# Patient Record
Sex: MALE | Race: WHITE
[De-identification: names, ages, dates, MRNs, and addresses within clinical notes are randomized per-mention and may not be internally consistent; named-entity substitution may affect disease eponyms.]

---

## 2017-06-28 ENCOUNTER — HOSPITAL ENCOUNTER (OUTPATIENT)
Dept: HOSPITAL 53 - M SFHCPLAZ | Age: 82
End: 2017-06-28
Attending: FAMILY MEDICINE
Payer: MEDICARE

## 2017-06-28 DIAGNOSIS — R41.3: ICD-10-CM

## 2017-06-28 DIAGNOSIS — Z79.899: ICD-10-CM

## 2017-06-28 DIAGNOSIS — Z79.84: ICD-10-CM

## 2017-06-28 DIAGNOSIS — R41.89: Primary | ICD-10-CM

## 2017-06-28 LAB
T4 FREE SERPL-MCNC: 0.86 NG/DL (ref 0.76–1.46)
VIT B12 SERPL-MCNC: 1073 PG/ML (ref 247–911)

## 2017-06-28 PROCEDURE — 36415 COLL VENOUS BLD VENIPUNCTURE: CPT

## 2017-06-28 PROCEDURE — 82607 VITAMIN B-12: CPT

## 2017-06-28 PROCEDURE — 82306 VITAMIN D 25 HYDROXY: CPT

## 2017-06-28 PROCEDURE — 84439 ASSAY OF FREE THYROXINE: CPT

## 2017-06-28 PROCEDURE — 84443 ASSAY THYROID STIM HORMONE: CPT

## 2017-07-25 ENCOUNTER — HOSPITAL ENCOUNTER (OUTPATIENT)
Dept: HOSPITAL 53 - M LABDRAW1 | Age: 82
End: 2017-07-25
Attending: PSYCHIATRY & NEUROLOGY
Payer: MEDICARE

## 2017-07-25 DIAGNOSIS — F03.90: Primary | ICD-10-CM

## 2017-07-25 DIAGNOSIS — Z13.29: ICD-10-CM

## 2017-07-25 DIAGNOSIS — R20.9: ICD-10-CM

## 2017-07-25 DIAGNOSIS — Z79.899: ICD-10-CM

## 2017-07-25 LAB
ALBUMIN SERPL BCG-MCNC: 3.9 GM/DL (ref 3.2–5.2)
ALBUMIN/GLOB SERPL: 1.11 {RATIO} (ref 1–1.93)
ALP SERPL-CCNC: 65 U/L (ref 45–117)
ALT SERPL W P-5'-P-CCNC: 18 U/L (ref 12–78)
ANION GAP SERPL CALC-SCNC: 3 MEQ/L (ref 8–16)
AST SERPL-CCNC: 14 U/L (ref 15–37)
BASOPHILS # BLD AUTO: 0 K/MM3 (ref 0–0.2)
BASOPHILS NFR BLD AUTO: 0.5 % (ref 0–1)
BILIRUB SERPL-MCNC: 0.6 MG/DL (ref 0.2–1)
BUN SERPL-MCNC: 21 MG/DL (ref 7–18)
CALCIUM SERPL-MCNC: 8.3 MG/DL (ref 8.8–10.2)
CHLORIDE SERPL-SCNC: 102 MEQ/L (ref 98–107)
CO2 SERPL-SCNC: 30 MEQ/L (ref 21–32)
CREAT SERPL-MCNC: 1.16 MG/DL (ref 0.7–1.3)
EOSINOPHIL # BLD AUTO: 0.1 K/MM3 (ref 0–0.5)
EOSINOPHIL NFR BLD AUTO: 1.7 % (ref 0–3)
ERYTHROCYTE [DISTWIDTH] IN BLOOD BY AUTOMATED COUNT: 13.8 % (ref 11.5–14.5)
ERYTHROCYTE [SEDIMENTATION RATE] IN BLOOD BY WESTERGREN METHOD: 35 MM/HR (ref 0–20)
EST. AVERAGE GLUCOSE BLD GHB EST-MCNC: 131 MG/DL (ref 60–110)
FOLATE SERPL-MCNC: > 24 NG/ML
GFR SERPL CREATININE-BSD FRML MDRD: > 60 ML/MIN/{1.73_M2} (ref 35–?)
GLUCOSE SERPL-MCNC: 102 MG/DL (ref 83–110)
LARGE UNSTAINED CELL #: 0.1 K/MM3 (ref 0–0.4)
LARGE UNSTAINED CELL %: 2.1 % (ref 0–4)
LYMPHOCYTES # BLD AUTO: 3 K/MM3 (ref 1.5–4.5)
LYMPHOCYTES NFR BLD AUTO: 44.7 % (ref 24–44)
MCH RBC QN AUTO: 31.9 PG (ref 27–33)
MCHC RBC AUTO-ENTMCNC: 34.6 G/DL (ref 32–36.5)
MCV RBC AUTO: 92.4 FL (ref 80–96)
MONOCYTES # BLD AUTO: 0.3 K/MM3 (ref 0–0.8)
MONOCYTES NFR BLD AUTO: 4.3 % (ref 0–5)
NEUTROPHILS # BLD AUTO: 3.1 K/MM3 (ref 1.8–7.7)
NEUTROPHILS NFR BLD AUTO: 46.7 % (ref 36–66)
PLATELET # BLD AUTO: 189 K/MM3 (ref 150–450)
POTASSIUM SERPL-SCNC: 4.2 MEQ/L (ref 3.5–5.1)
PROT SERPL-MCNC: 7.4 GM/DL (ref 6.4–8.2)
SODIUM SERPL-SCNC: 135 MEQ/L (ref 136–145)
VIT B12 SERPL-MCNC: 1762 PG/ML
WBC # BLD AUTO: 6.7 K/MM3 (ref 4–10)

## 2017-07-29 LAB — A-TOCOPHEROL VIT E SERPL-MCNC: 28.5 MG/L (ref 5.3–17.5)

## 2017-11-17 ENCOUNTER — HOSPITAL ENCOUNTER (OUTPATIENT)
Dept: HOSPITAL 53 - M LABDRAW1 | Age: 82
End: 2017-11-17
Attending: ORTHOPAEDIC SURGERY
Payer: MEDICARE

## 2017-11-17 DIAGNOSIS — G56.02: ICD-10-CM

## 2017-11-17 DIAGNOSIS — Z01.812: Primary | ICD-10-CM

## 2017-11-17 LAB
ANION GAP SERPL CALC-SCNC: 7 MEQ/L (ref 8–16)
BUN SERPL-MCNC: 23 MG/DL (ref 7–18)
CALCIUM SERPL-MCNC: 8.9 MG/DL (ref 8.8–10.2)
CHLORIDE SERPL-SCNC: 102 MEQ/L (ref 98–107)
CO2 SERPL-SCNC: 29 MEQ/L (ref 21–32)
CREAT SERPL-MCNC: 1.22 MG/DL (ref 0.7–1.3)
GFR SERPL CREATININE-BSD FRML MDRD: > 60 ML/MIN/{1.73_M2} (ref 35–?)
GLUCOSE SERPL-MCNC: 100 MG/DL (ref 83–110)
POTASSIUM SERPL-SCNC: 4.3 MEQ/L (ref 3.5–5.1)
SODIUM SERPL-SCNC: 138 MEQ/L (ref 136–145)

## 2017-12-04 ENCOUNTER — HOSPITAL ENCOUNTER (OUTPATIENT)
Dept: HOSPITAL 53 - M SFHCPLAZ | Age: 82
End: 2017-12-04
Attending: FAMILY MEDICINE
Payer: MEDICARE

## 2017-12-04 DIAGNOSIS — R41.3: Primary | ICD-10-CM

## 2017-12-04 LAB — VIT B12 SERPL-MCNC: 398 PG/ML (ref 247–911)

## 2019-03-22 ENCOUNTER — APPOINTMENT (RX ONLY)
Dept: URBAN - METROPOLITAN AREA CLINIC 61 | Facility: CLINIC | Age: 84
Setting detail: DERMATOLOGY
End: 2019-03-22

## 2019-03-22 DIAGNOSIS — L81.4 OTHER MELANIN HYPERPIGMENTATION: ICD-10-CM

## 2019-03-22 DIAGNOSIS — L82.1 OTHER SEBORRHEIC KERATOSIS: ICD-10-CM

## 2019-03-22 DIAGNOSIS — Z85.828 PERSONAL HISTORY OF OTHER MALIGNANT NEOPLASM OF SKIN: ICD-10-CM | Status: RESOLVED

## 2019-03-22 DIAGNOSIS — D22 MELANOCYTIC NEVI: ICD-10-CM

## 2019-03-22 PROBLEM — M12.9 ARTHROPATHY, UNSPECIFIED: Status: ACTIVE | Noted: 2019-03-22

## 2019-03-22 PROBLEM — L57.0 ACTINIC KERATOSIS: Status: ACTIVE | Noted: 2019-03-22

## 2019-03-22 PROBLEM — K21.9 GASTRO-ESOPHAGEAL REFLUX DISEASE WITHOUT ESOPHAGITIS: Status: ACTIVE | Noted: 2019-03-22

## 2019-03-22 PROBLEM — I10 ESSENTIAL (PRIMARY) HYPERTENSION: Status: ACTIVE | Noted: 2019-03-22

## 2019-03-22 PROBLEM — E78.5 HYPERLIPIDEMIA, UNSPECIFIED: Status: ACTIVE | Noted: 2019-03-22

## 2019-03-22 PROBLEM — E13.9 OTHER SPECIFIED DIABETES MELLITUS WITHOUT COMPLICATIONS: Status: ACTIVE | Noted: 2019-03-22

## 2019-03-22 PROBLEM — D22.5 MELANOCYTIC NEVI OF TRUNK: Status: ACTIVE | Noted: 2019-03-22

## 2019-03-22 PROBLEM — M06.9 RHEUMATOID ARTHRITIS, UNSPECIFIED: Status: ACTIVE | Noted: 2019-03-22

## 2019-03-22 PROCEDURE — 99213 OFFICE O/P EST LOW 20 MIN: CPT

## 2019-03-22 PROCEDURE — ? INVENTORY

## 2019-03-22 PROCEDURE — ? COUNSELING

## 2019-03-22 ASSESSMENT — LOCATION SIMPLE DESCRIPTION DERM
LOCATION SIMPLE: UPPER BACK
LOCATION SIMPLE: LEFT SHOULDER
LOCATION SIMPLE: RIGHT SHOULDER
LOCATION SIMPLE: LEFT UPPER BACK

## 2019-03-22 ASSESSMENT — LOCATION ZONE DERM
LOCATION ZONE: ARM
LOCATION ZONE: TRUNK

## 2019-03-22 ASSESSMENT — LOCATION DETAILED DESCRIPTION DERM
LOCATION DETAILED: LEFT MID-UPPER BACK
LOCATION DETAILED: LEFT POSTERIOR SHOULDER
LOCATION DETAILED: RIGHT POSTERIOR SHOULDER
LOCATION DETAILED: INFERIOR THORACIC SPINE

## 2019-07-10 ENCOUNTER — HOSPITAL ENCOUNTER (OUTPATIENT)
Dept: HOSPITAL 53 - M ED | Age: 84
Setting detail: OBSERVATION
LOS: 1 days | Discharge: HOME | End: 2019-07-11
Attending: INTERNAL MEDICINE | Admitting: INTERNAL MEDICINE
Payer: MEDICARE

## 2019-07-10 VITALS — HEIGHT: 69 IN | WEIGHT: 230.49 LBS | BODY MASS INDEX: 34.14 KG/M2

## 2019-07-10 VITALS — SYSTOLIC BLOOD PRESSURE: 122 MMHG | DIASTOLIC BLOOD PRESSURE: 60 MMHG

## 2019-07-10 DIAGNOSIS — I69.311: ICD-10-CM

## 2019-07-10 DIAGNOSIS — I48.91: ICD-10-CM

## 2019-07-10 DIAGNOSIS — R55: Primary | ICD-10-CM

## 2019-07-10 DIAGNOSIS — M06.9: ICD-10-CM

## 2019-07-10 DIAGNOSIS — M19.90: ICD-10-CM

## 2019-07-10 DIAGNOSIS — G47.33: ICD-10-CM

## 2019-07-10 DIAGNOSIS — E11.9: ICD-10-CM

## 2019-07-10 DIAGNOSIS — E78.5: ICD-10-CM

## 2019-07-10 DIAGNOSIS — Z87.820: ICD-10-CM

## 2019-07-10 DIAGNOSIS — Z79.82: ICD-10-CM

## 2019-07-10 DIAGNOSIS — Z79.899: ICD-10-CM

## 2019-07-10 DIAGNOSIS — I10: ICD-10-CM

## 2019-07-10 DIAGNOSIS — Z79.84: ICD-10-CM

## 2019-07-10 DIAGNOSIS — H40.9: ICD-10-CM

## 2019-07-10 DIAGNOSIS — F03.90: ICD-10-CM

## 2019-07-10 DIAGNOSIS — K21.9: ICD-10-CM

## 2019-07-10 LAB
AMPHETAMINES UR QL SCN: NEGATIVE
BARBITURATES UR QL SCN: NEGATIVE
BASE EXCESS BLDV CALC-SCNC: -2.8 MMOL/L (ref -2–2)
BASOPHILS # BLD AUTO: 0 10^3/UL (ref 0–0.2)
BASOPHILS NFR BLD AUTO: 0.2 % (ref 0–1)
BENZODIAZ UR QL SCN: NEGATIVE
BUN SERPL-MCNC: 28 MG/DL (ref 7–18)
BZE UR QL SCN: NEGATIVE
CALCIUM SERPL-MCNC: 8.3 MG/DL (ref 8.8–10.2)
CANNABINOIDS UR QL SCN: NEGATIVE
CHLORIDE SERPL-SCNC: 109 MEQ/L (ref 98–107)
CK MB CFR.DF SERPL CALC: 1.42
CK MB SERPL-MCNC: 1.9 NG/ML (ref ?–3.6)
CK SERPL-CCNC: 134 U/L (ref 39–308)
CO2 BLDV CALC-SCNC: 25.8 MEQ/L (ref 24–28)
CO2 SERPL-SCNC: 30 MEQ/L (ref 21–32)
CREAT SERPL-MCNC: 1.24 MG/DL (ref 0.7–1.3)
EOSINOPHIL # BLD AUTO: 0.1 10^3/UL (ref 0–0.5)
EOSINOPHIL NFR BLD AUTO: 0.9 % (ref 0–3)
ETHANOL SERPL-MCNC: < 0.003 % (ref 0–0.01)
GFR SERPL CREATININE-BSD FRML MDRD: 59.4 ML/MIN/{1.73_M2} (ref 35–?)
GLUCOSE SERPL-MCNC: 137 MG/DL (ref 70–100)
HCO3 BLDV-SCNC: 24.2 MEQ/L (ref 23–27)
HCO3 STD BLDV-SCNC: 21.3 MEQ/L
HCT VFR BLD AUTO: 33.7 % (ref 42–52)
HGB BLD-MCNC: 11.3 G/DL (ref 13.5–17.5)
INR PPP: 1.14
LYMPHOCYTES # BLD AUTO: 1.3 10^3/UL (ref 1.5–4.5)
LYMPHOCYTES NFR BLD AUTO: 20.5 % (ref 24–44)
MCH RBC QN AUTO: 32.9 PG (ref 27–33)
MCHC RBC AUTO-ENTMCNC: 33.5 G/DL (ref 32–36.5)
MCV RBC AUTO: 98.3 FL (ref 80–96)
METHADONE UR QL SCN: NEGATIVE
MONOCYTES # BLD AUTO: 0.4 10^3/UL (ref 0–0.8)
MONOCYTES NFR BLD AUTO: 5.8 % (ref 0–5)
NEUTROPHILS # BLD AUTO: 4.6 10^3/UL (ref 1.8–7.7)
NEUTROPHILS NFR BLD AUTO: 72.1 % (ref 36–66)
OPIATES UR QL SCN: NEGATIVE
PCO2 BLDV: 51.9 MMHG (ref 38–50)
PCP UR QL SCN: NEGATIVE
PH BLDV: 7.29 UNITS (ref 7.33–7.43)
PLATELET # BLD AUTO: 127 10^3/UL (ref 150–450)
PO2 BLDV: 32.8 MMHG (ref 30–50)
POTASSIUM SERPL-SCNC: 4.7 MEQ/L (ref 3.5–5.1)
PROTHROMBIN TIME: 14.3 SECONDS (ref 11.8–14)
RBC # BLD AUTO: 3.43 10^6/UL (ref 4.3–6.1)
SAO2 % BLDV: 52.1 % (ref 60–80)
SODIUM SERPL-SCNC: 142 MEQ/L (ref 136–145)
TROPONIN I SERPL-MCNC: < 0.02 NG/ML (ref ?–0.1)
TSH SERPL DL<=0.005 MIU/L-ACNC: 1.88 UIU/ML (ref 0.36–3.74)
WBC # BLD AUTO: 6.4 10^3/UL (ref 4–10)

## 2019-07-10 PROCEDURE — 80048 BASIC METABOLIC PNL TOTAL CA: CPT

## 2019-07-10 PROCEDURE — 71045 X-RAY EXAM CHEST 1 VIEW: CPT

## 2019-07-10 PROCEDURE — 80307 DRUG TEST PRSMV CHEM ANLYZR: CPT

## 2019-07-10 PROCEDURE — 84484 ASSAY OF TROPONIN QUANT: CPT

## 2019-07-10 PROCEDURE — 82553 CREATINE MB FRACTION: CPT

## 2019-07-10 PROCEDURE — 70450 CT HEAD/BRAIN W/O DYE: CPT

## 2019-07-10 PROCEDURE — 97530 THERAPEUTIC ACTIVITIES: CPT

## 2019-07-10 PROCEDURE — 99285 EMERGENCY DEPT VISIT HI MDM: CPT

## 2019-07-10 PROCEDURE — 82550 ASSAY OF CK (CPK): CPT

## 2019-07-10 PROCEDURE — 84443 ASSAY THYROID STIM HORMONE: CPT

## 2019-07-10 PROCEDURE — 36415 COLL VENOUS BLD VENIPUNCTURE: CPT

## 2019-07-10 PROCEDURE — 97161 PT EVAL LOW COMPLEX 20 MIN: CPT

## 2019-07-10 PROCEDURE — 93041 RHYTHM ECG TRACING: CPT

## 2019-07-10 PROCEDURE — 82803 BLOOD GASES ANY COMBINATION: CPT

## 2019-07-10 PROCEDURE — 85610 PROTHROMBIN TIME: CPT

## 2019-07-10 PROCEDURE — 85025 COMPLETE CBC W/AUTO DIFF WBC: CPT

## 2019-07-10 PROCEDURE — 93005 ELECTROCARDIOGRAM TRACING: CPT

## 2019-07-10 PROCEDURE — 96372 THER/PROPH/DIAG INJ SC/IM: CPT

## 2019-07-10 PROCEDURE — 83605 ASSAY OF LACTIC ACID: CPT

## 2019-07-10 PROCEDURE — 97165 OT EVAL LOW COMPLEX 30 MIN: CPT

## 2019-07-10 RX ADMIN — SODIUM CHLORIDE SCH UNITS: 4.5 INJECTION, SOLUTION INTRAVENOUS at 22:17

## 2019-07-10 RX ADMIN — SODIUM CHLORIDE SCH MLS/HR: 9 INJECTION, SOLUTION INTRAVENOUS at 14:01

## 2019-07-10 RX ADMIN — BRIMONIDINE TARTRATE SCH DROP: 1 SOLUTION/ DROPS OPHTHALMIC at 22:24

## 2019-07-10 NOTE — HPEPDOC
General


Date of Admission


07/10/2019


Date of Service:  Jul 10, 2019


Chief Complaint


The patient is a 83-year-old male admitted with a reason for visit of Syncope.


Source:  Old records


Exam Limitations:  Dementia


Timing/Duration:  Unsure


Severity:  Mild


Associated Symptoms:  Unobtainable





History of Present Illness


This is an 83-year-old male who apparently had a syncopal event at home while 

working outdoors gardening. He is unable to tell me anything about the event. 

Currently, he is oriented strictly to self. There are no family members at 

bedside.





Home Medications


Scheduled


Aspirin (Aspir 81) 81 Mg Tablet.dr, 81 MG PO QHS, (Reported)


Brimonidine Tartrate (Alphagan P) 0.1% 5ML Drops, 1 DROP OU Q8H, (Reported)


Donepezil HCl (Donepezil HCl) 10 Mg Tablet, 10 MG PO QHS, (Reported)


Dorzolamide HCl/Timolol Maleat (Dorzolamide-Timolol Eye Drops) 10 Ml Drops, 1 

DROP OU BID, (Reported)


Folic Acid (Folic Acid) 1 Mg Tablet, 1 MG PO DAILY, (Reported)


Latanoprost (Xalatan) 0.005% 2.5ML Drops, 1 DROP OU QHS, (Reported)


Lisinopril (Lisinopril) 5 Mg Tablet, 5 MG PO QHS, (Reported)


Memantine HCl (Memantine HCl) 10 Mg Tablet, 10 MG PO BID, (Reported)


Metformin HCl (Metformin HCl) 1,000 Mg Tablet, 1,000 MG PO BID, (Reported)


Mirabegron (Myrbetriq) 50 Mg Tab.er.24h, 50 MG PO DAILY, (Reported)


Quetiapine Fumarate (Quetiapine Fumarate) 25 Mg Tablet, 50 MG PO DAILY, 

(Reported)


Rosuvastatin Calcium (Rosuvastatin Calcium) Unknown Strength Tablet, Unknown 

Dose PO QHS, (Reported)





Allergies


Coded Allergies:  


     No Known Allergies (Verified , 10/9/03)





Past Medical History


Medical History


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 

Alzheimer's.


Significant memory deficit


Dyslipidemia.


Essential hypertension.


Obstructive sleep apnea, it is unclear whether he currently uses a CPAP mask.


Non-insulin-dependent diabetes mellitus.


Glaucoma.


Rheumatoid arthritis.


History of traumatic brain injury.


Gastroesophageal reflux disease.


Osteoarthritis


Surgical History


Surgical history is said to include


Right total hip arthroplasty, bilateral total knee replacements, unspecified 

shoulder surgery





Family History


Family history is unobtainable from the patient at this time.





Social History


* Smoker:  Denies


Alcohol:  Denies


Drugs:  denies


Psychosocial History:  Dementia





A-FIB/CHADSVASC


A-FIB History


Current/History of A-Fib/PAF?:  No


Current PO Anticoag Therapy:  No





Review of Systems


Other systems


I am unable to obtain a full review of systems from this patient. He simply 

states that he has nothing.





Physical Examination


General Exam:  Positive: Cooperative, No Acute Distress


Eye Exam:  Positive: PERRLA, Conjunctiva & lids normal


ENT Exam:  Positive: Atraumatic, Mucous membr. moist/pink, Tongue Midline, Other

ENT (good dentition)


Neck Exam:  Positive: Supple; 


   Negative: JVD, thyromegaly, +2 carotid pulse wo bruit, Lymphadenopathy, Other


Chest Exam:  Positive: Clear to auscultation, Normal air movement


Heart Exam:  Positive: Rate Normal, Regular Rhythm


Abdomen Exam:  Positive: Normal bowel sounds, Soft


Extremity Exam:  Positive: Normal pulses


Skin Exam:  Positive: Nl turgor and temperature (no notable wounds or abrasions)


Neuro Exam:  Positive: Cranial Nerves 3-12 NL


Psych Exam:  Positive: Memory Intact (the patient is very pleasant but clearly 

has memory and cognition deficits)





Vital Signs





Vital Signs








  Date Time  Temp Pulse Resp B/P (MAP) Pulse Ox O2 Delivery O2 Flow Rate FiO2


 


7/10/19 17:00   16 139/69 (92)    


 


7/10/19 16:53  66   100   


 


7/10/19 13:34 97.1     Room Air  











Laboratory Data


Labs 24H


Laboratory Tests 2


7/10/19 13:41: 


Immature Granulocyte % (Auto) 0.5, White Blood Count 6.4, Red Blood Count 3.43L,

Hemoglobin 11.3L, Hematocrit 33.7L, Mean Corpuscular Volume 98.3H, Mean 

Corpuscular Hemoglobin 32.9, Mean Corpuscular Hemoglobin Concent 33.5, Red Cell 

Distribution Width 14.6H, Platelet Count 127L, Neutrophils (%) (Auto) 72.1H, 

Lymphocytes (%) (Auto) 20.5L, Monocytes (%) (Auto) 5.8H, Eosinophils (%) (Auto) 

0.9, Basophils (%) (Auto) 0.2, Neutrophils # (Auto) 4.6, Lymphocytes # (Auto) 

1.3L, Monocytes # (Auto) 0.4, Eosinophils # (Auto) 0.1, Basophils # (Auto) 0.0, 

Nucleated Red Blood Cells % (auto) 0.0, Prothrombin Time 14.3H, Prothromb Time 

International Ratio 1.14, Blood Gas Bicarbonate Standard 21.3, Venous Blood pH 

7.286L, Venous Blood Partial Pressure CO2 51.9H, Venous Blood Partial Pressure 

O2 32.8, Venous Blood Total Carbon Dioxide 25.8, Venous Blood HCO3 24.2, Venous 

Blood Oxygen Saturation 52.1L, Venous Blood Base Excess -2.8L, Anion Gap 3L, 

Glomerular Filtration Rate 59.4, Lactic Acid Level 1.0, Blood Urea Nitrogen 28H,

Creatinine 1.24, Sodium Level 142, Potassium Level 4.7, Chloride Level 109H, 

Carbon Dioxide Level 30, Calcium Level 8.3L, Total Creatine Kinase 134, Creatine

Kinase MB 1.9, Creatine Kinase MB Relative Index 1.42, Troponin I < 0.02, 

Thyroid Stimulating Hormone (TSH) 1.880, Ethyl Alcohol Level < 0.003


CBC/BMP


Laboratory Tests


7/10/19 13:41








Red Blood Count 3.43 L, Mean Corpuscular Volume 98.3 H, Mean Corpuscular 

Hemoglobin 32.9, Mean Corpuscular Hemoglobin Concent 33.5, Red Cell Distribution

Width 14.6 H, Neutrophils (%) (Auto) 72.1 H, Lymphocytes (%) (Auto) 20.5 L, 

Monocytes (%) (Auto) 5.8 H, Eosinophils (%) (Auto) 0.9, Basophils (%) (Auto) 

0.2, Neutrophils # (Auto) 4.6, Lymphocytes # (Auto) 1.3 L, Monocytes # (Auto) 

0.4, Eosinophils # (Auto) 0.1, Basophils # (Auto) 0.0, Calcium Level 8.3 L, 

Total Creatine Kinase 134





 Assessment/Plan


This is an 83-year-old male admitted with syncope. He is currently awake, alert 

to his environment and conversant. At baseline he has dementia and memory 

deficits. Head CT was notable for significant parenchymal atrophy, but otherwise

no sign of injury or bleed. There is presence of old infarct. Laboratory data is

otherwise unremarkable. Patient has not demonstrated any form of cardiac 

dysrhythmia. We will monitor the patient overnight and have him assessed by 

physical and occupational therapy services. If he is otherwise intact he would 

be appropriate for discharge to home. He is consequently placed on observation 

status.





Plan / VTE


VTE Prophylaxis Ordered?:  Yes





Plan


Diet:  Continue Current


Activity:  Encourage Ambulation (with assistance)


Therapy:  PT, OT


Anticipated Discharge:  Home With Services











LUIS MAGDALENO MD         Jul 10, 2019 18:02

## 2019-07-10 NOTE — REP
Noncontrast CT brain:

 

History:  Syncope.

 

Comparison head CT study August 29, 2011.

 

CT findings:  Preliminary digital  radiograph is unremarkable.  On bone

window settings, the bony calvarium is intact.  Visualized paranasal sinuses are

clear.  There is vascular calcification in the distal internal carotid arteries

bilaterally.  There is moderate diffuse cerebral atrophy.  Periventricular white

matter changes are seen consistent with small vessel atherosclerotic findings.

There is a fairly large dense dural calcification in the anterior falx.  There is

no evidence of acute infarction.  No hemorrhage is seen.  There is a small low

density area at the inferior aspect of the basal ganglia on the left consistent

with an old lacunar infarct.  This is unchanged from the 2011 prior study.  There

is no evidence of acute hemorrhage.  No acute infarct is seen.  No mass or

midline shift is observed.

 

Impression:

 

Diffuse moderate atrophy and small vessel changes.  Vascular calcification.  Old

lacunar infarct left basal ganglia.  No acute intracranial abnormality.

 

 

Electronically Signed by

Carlitos Lindsey MD 07/10/2019 04:37 P

## 2019-07-10 NOTE — REP
CHEST, SINGLE VIEW:

 

COMPARISON:  09/13/2013

 

There is mild bibasilar fibroatelectatic change. There is no acute infiltrate.

Heart does not appear to be significantly enlarged. Mediastinal silhouette is

unremarkable.

 

IMPRESSION:

 

No acute infiltrate.

 

 

Electronically Signed by

Joaquín Rosas MD 07/12/2019 12:33 P

## 2019-07-11 VITALS — DIASTOLIC BLOOD PRESSURE: 62 MMHG | SYSTOLIC BLOOD PRESSURE: 115 MMHG

## 2019-07-11 LAB
BUN SERPL-MCNC: 21 MG/DL (ref 7–18)
CALCIUM SERPL-MCNC: 8.6 MG/DL (ref 8.8–10.2)
CHLORIDE SERPL-SCNC: 112 MEQ/L (ref 98–107)
CO2 SERPL-SCNC: 27 MEQ/L (ref 21–32)
CREAT SERPL-MCNC: 1.07 MG/DL (ref 0.7–1.3)
GFR SERPL CREATININE-BSD FRML MDRD: > 60 ML/MIN/{1.73_M2} (ref 35–?)
GLUCOSE SERPL-MCNC: 99 MG/DL (ref 70–100)
POTASSIUM SERPL-SCNC: 3.7 MEQ/L (ref 3.5–5.1)
SODIUM SERPL-SCNC: 144 MEQ/L (ref 136–145)

## 2019-07-11 RX ADMIN — BRIMONIDINE TARTRATE SCH DROP: 1 SOLUTION/ DROPS OPHTHALMIC at 05:11

## 2019-07-11 RX ADMIN — SODIUM CHLORIDE SCH MLS/HR: 9 INJECTION, SOLUTION INTRAVENOUS at 00:06

## 2019-07-11 RX ADMIN — SODIUM CHLORIDE SCH MLS/HR: 9 INJECTION, SOLUTION INTRAVENOUS at 09:25

## 2019-07-11 RX ADMIN — SODIUM CHLORIDE SCH UNITS: 4.5 INJECTION, SOLUTION INTRAVENOUS at 09:00

## 2019-07-11 NOTE — DS.PDOC
Discharge Summary


General


Date of Admission


Jul 10, 2019 at 18:02


Date of Discharge


07/11/2019





Discharge Summary


PROCEDURES PERFORMED DURING STAY: None.





ADMITTING DIAGNOSES: 


1. Syncope.





DISCHARGE DIAGNOSES:


1. Syncope.


Dementia, vascular versus Alzheimer's type.


Dyslipidemia.


Essential hypertension.


Obstructive sleep apnea.


Non-insulin-dependent diabetes mellitus, glaucoma, rheumatoid arthritis, history

of traumatic brain injury, gastroesophageal reflux disease, osteoarthritis.





COMPLICATIONS/CHIEF COMPLAINT: Syncope And Colapse.





HISTORY OF PRESENT ILLNESS/HOSPITAL COURSE: This is an 83-year-old male who had 

been out gardening. It was a rather hot and humid day. He apparently had been 

burning branches. He was out in the sun and also inhaling smoke. His wife and 

advised him to come inside. When she went back out he was still there and 

subsequently passed out. He was then brought to the emergency room.


Patient was placed on the [facility] floor. He fortunately did not have any new 

injuries seen to his head CT; patient has a history of stroke and closed head 

injury resulting in memory deficits. Note was made of remarkable atrophy and old

infarcts. Patient did demonstrate atrial fibrillation but stated in controlled 

rate. Patient was assessed by physical and occupational therapy services; it was

felt he might benefit from home health physical therapy for safety reasons as he

is impulsive.


Given his atrial fibrillation. We did discuss anticoagulation. He is at such 

high risk of recurrent falls and has had so many head injuries that 

anticoagulation is not advised. He was otherwise medically stable for discharge 

to home.. 





DISCHARGE MEDICATIONS: Please see below.


 


ALLERGIES: Please see below.





PHYSICAL EXAMINATION ON DISCHARGE:


VITAL SIGNS: Please see below.


GENERAL: Patient is awake and oriented primarily to self


HEENT: Neck is supple with no adenopathy or thyromegaly, oral mucosa is moist, 

he has good dentition


CARDIOVASCULAR EXAMINATION: Currently regular rate and rhythm with a normal S1 

and S2. No appreciable murmur


RESPIRATORY EXAMINATION: Clear to auscultation


ABDOMINAL EXAMINATION: Soft, nontender, nondistended with positive bowel tones


EXTREMITIES: No peripheral edema or lesions


SKIN: No jaundice, no bruising


NEUROLOGICAL EXAMINATION: . There is no focal neuromotor deficit, cranial nerves

II through XII are grossly intact to function 


PSYCHIATRIC EXAMINATION: Patient has remarkable memory deficits, he can be 

redirected by his wife.





LABORATORY DATA: Please see below.





IMAGING: 





PROGNOSIS: 





ACTIVITY: As tolerated.





DIET: As tolerated





DISCHARGE PLAN: The patient is stable for discharge to home. He will follow-up 

with his primary care provider in 1-2 weeks. There are no changes to his home 

medications. Again, chronic anticoagulation is not recommended.





DISPOSITION: 01 Home, Self-Care.





DISCHARGE INSTRUCTIONS:








ITEMS TO FOLLOWUP ON ON OUTPATIENT:








DISCHARGE CONDITION: Stable.





TIME SPENT ON DISCHARGE: Greater than 35 minutes.





Vital Signs/I&Os





Vital Signs








  Date Time  Temp Pulse Resp B/P (MAP) Pulse Ox O2 Delivery O2 Flow Rate FiO2


 


7/11/19 06:00 98.1 72 19 115/62 (79) 98   


 


7/10/19 13:34      Room Air  














I&O- Last 24 Hours up to 6 AM 


 


 7/11/19





 05:59


 


Intake Total 300 ml


 


Output Total 400 ml


 


Balance -100 ml











Laboratory Data


Labs 24H


Laboratory Tests 2


7/10/19 21:51: Bedside Glucose (Misc Panel) 135H


7/11/19 05:55: 


Anion Gap 5L, Glomerular Filtration Rate > 60.0, Blood Urea Nitrogen 21H, 

Creatinine 1.07, Sodium Level 144, Potassium Level 3.7#, Chloride Level 112H, 

Carbon Dioxide Level 27, Calcium Level 8.6L


CBC/BMP


Laboratory Tests


7/11/19 05:55








Calcium Level 8.6 L


FSBS





Laboratory Tests








Test


 7/10/19


21:51 Range/Units


 


 


Bedside Glucose (Misc Panel) 135   MG/DL











Discharge Medications


Scheduled


Aspirin (Aspir 81) 81 Mg Tablet.dr, 81 MG PO QHS, (Reported)


Brimonidine Tartrate (Alphagan P) 0.1% 5ML Drops, 1 DROP OU Q8H, (Reported)


Donepezil HCl (Donepezil HCl) 10 Mg Tablet, 10 MG PO QHS, (Reported)


Dorzolamide HCl/Timolol Maleat (Dorzolamide-Timolol Eye Drops) 10 Ml Drops, 1 

DROP OU BID, (Reported)


Folic Acid (Folic Acid) 1 Mg Tablet, 1 MG PO DAILY, (Reported)


Latanoprost (Xalatan) 0.005% 2.5ML Drops, 1 DROP OU QHS, (Reported)


Lisinopril (Lisinopril) 5 Mg Tablet, 5 MG PO QHS, (Reported)


Memantine HCl (Memantine HCl) 10 Mg Tablet, 10 MG PO BID, (Reported)


Metformin HCl (Metformin HCl) 1,000 Mg Tablet, 1,000 MG PO BID, (Reported)


Mirabegron (Myrbetriq) 50 Mg Tab.er.24h, 50 MG PO DAILY, (Reported)


Quetiapine Fumarate (Quetiapine Fumarate) 25 Mg Tablet, 50 MG PO DAILY, 

(Reported)


Rosuvastatin Calcium (Rosuvastatin Calcium) Unknown Strength Tablet, Unknown 

Dose PO QHS, (Reported)





Allergies


Coded Allergies:  


     No Known Allergies (Verified , 10/9/03)











LUIS MAGDALENO MD         Jul 11, 2019 20:09

## 2019-07-11 NOTE — ECGEPIP
[facility] - ED

                                       

                                       Test Date:    2019-07-10

Pat Name:     KEITH DAILY          Department:   

Patient ID:   [id number]                 Room:         -

Gender:       Male                     Technician:   

:          1935               Requested By: KENROY VILLEDA

Order Number: [id number]     Reading MD:   Margie Storm

                                 Measurements

Intervals                              Axis          

Rate:         62                       P:            

PA:           -1                       QRS:          64

QRSD:         95                       T:            30

QT:           416                                    

QTc:          424                                    

                           Interpretive Statements

ATRIAL FIBRILLATION

ABNORMAL RHYTHM ECG

NSTTW abnormalities

No prior

Electronically Signed on 2019 7:40:19 EDT by Margie Storm

## 2019-09-30 ENCOUNTER — HOSPITAL ENCOUNTER (INPATIENT)
Dept: HOSPITAL 53 - M ED | Age: 84
LOS: 16 days | Discharge: NURSING, REHAB OR LONG-TERM CARE | DRG: 309 | End: 2019-10-16
Attending: INTERNAL MEDICINE | Admitting: INTERNAL MEDICINE
Payer: MEDICARE

## 2019-09-30 VITALS — BODY MASS INDEX: 33.41 KG/M2 | HEIGHT: 68 IN | WEIGHT: 220.46 LBS

## 2019-09-30 VITALS — SYSTOLIC BLOOD PRESSURE: 111 MMHG | DIASTOLIC BLOOD PRESSURE: 77 MMHG

## 2019-09-30 DIAGNOSIS — Z96.641: ICD-10-CM

## 2019-09-30 DIAGNOSIS — R26.89: ICD-10-CM

## 2019-09-30 DIAGNOSIS — G30.9: ICD-10-CM

## 2019-09-30 DIAGNOSIS — F02.80: ICD-10-CM

## 2019-09-30 DIAGNOSIS — Z88.8: ICD-10-CM

## 2019-09-30 DIAGNOSIS — I48.20: ICD-10-CM

## 2019-09-30 DIAGNOSIS — F01.51: ICD-10-CM

## 2019-09-30 DIAGNOSIS — H40.9: ICD-10-CM

## 2019-09-30 DIAGNOSIS — Z79.899: ICD-10-CM

## 2019-09-30 DIAGNOSIS — Z79.82: ICD-10-CM

## 2019-09-30 DIAGNOSIS — R00.1: Primary | ICD-10-CM

## 2019-09-30 DIAGNOSIS — G47.33: ICD-10-CM

## 2019-09-30 DIAGNOSIS — Z96.651: ICD-10-CM

## 2019-09-30 DIAGNOSIS — M19.90: ICD-10-CM

## 2019-09-30 DIAGNOSIS — M06.9: ICD-10-CM

## 2019-09-30 DIAGNOSIS — Z96.652: ICD-10-CM

## 2019-09-30 DIAGNOSIS — I10: ICD-10-CM

## 2019-09-30 DIAGNOSIS — E11.9: ICD-10-CM

## 2019-09-30 DIAGNOSIS — K21.9: ICD-10-CM

## 2019-09-30 DIAGNOSIS — T88.7XXA: ICD-10-CM

## 2019-09-30 DIAGNOSIS — I95.1: ICD-10-CM

## 2019-09-30 DIAGNOSIS — E78.5: ICD-10-CM

## 2019-09-30 LAB
BASOPHILS # BLD AUTO: 0 10^3/UL (ref 0–0.2)
BASOPHILS NFR BLD AUTO: 0.3 % (ref 0–1)
BUN SERPL-MCNC: 22 MG/DL (ref 7–18)
CALCIUM SERPL-MCNC: 8.9 MG/DL (ref 8.8–10.2)
CHLORIDE SERPL-SCNC: 105 MEQ/L (ref 98–107)
CK MB CFR.DF SERPL CALC: 1.62
CK MB SERPL-MCNC: 1.7 NG/ML (ref ?–3.6)
CK SERPL-CCNC: 105 U/L (ref 39–308)
CO2 SERPL-SCNC: 30 MEQ/L (ref 21–32)
CREAT SERPL-MCNC: 1.17 MG/DL (ref 0.7–1.3)
EOSINOPHIL # BLD AUTO: 0.1 10^3/UL (ref 0–0.5)
EOSINOPHIL NFR BLD AUTO: 1.1 % (ref 0–3)
GFR SERPL CREATININE-BSD FRML MDRD: > 60 ML/MIN/{1.73_M2} (ref 35–?)
GLUCOSE SERPL-MCNC: 134 MG/DL (ref 70–100)
HCT VFR BLD AUTO: 35.6 % (ref 42–52)
HGB BLD-MCNC: 11.9 G/DL (ref 13.5–17.5)
LYMPHOCYTES # BLD AUTO: 2.2 10^3/UL (ref 1.5–5)
LYMPHOCYTES NFR BLD AUTO: 34.3 % (ref 24–44)
MAGNESIUM SERPL-MCNC: 1.7 MG/DL (ref 1.8–2.4)
MCH RBC QN AUTO: 31.6 PG (ref 27–33)
MCHC RBC AUTO-ENTMCNC: 33.4 G/DL (ref 32–36.5)
MCV RBC AUTO: 94.7 FL (ref 80–96)
MONOCYTES # BLD AUTO: 0.5 10^3/UL (ref 0–0.8)
MONOCYTES NFR BLD AUTO: 7.3 % (ref 0–5)
NEUTROPHILS # BLD AUTO: 3.7 10^3/UL (ref 1.5–8.5)
NEUTROPHILS NFR BLD AUTO: 56.7 % (ref 36–66)
PLATELET # BLD AUTO: 149 10^3/UL (ref 150–450)
POTASSIUM SERPL-SCNC: 4.5 MEQ/L (ref 3.5–5.1)
RBC # BLD AUTO: 3.76 10^6/UL (ref 4.3–6.1)
SODIUM SERPL-SCNC: 141 MEQ/L (ref 136–145)
TROPONIN I SERPL-MCNC: < 0.02 NG/ML (ref ?–0.1)
TSH SERPL DL<=0.005 MIU/L-ACNC: 3.87 UIU/ML (ref 0.36–3.74)
WBC # BLD AUTO: 6.5 10^3/UL (ref 4–10)

## 2019-09-30 RX ADMIN — DORZOLAMIDE HYDROCHLORIDE AND TIMOLOL MALEATE SCH DROP: 20; 5 SOLUTION/ DROPS OPHTHALMIC at 22:47

## 2019-09-30 RX ADMIN — BRIMONIDINE TARTRATE SCH DROP: 1 SOLUTION/ DROPS OPHTHALMIC at 22:47

## 2019-09-30 RX ADMIN — MEMANTINE SCH MG: 5 TABLET ORAL at 22:48

## 2019-09-30 RX ADMIN — DONEPEZIL HYDROCHLORIDE SCH MG: 5 TABLET, FILM COATED ORAL at 22:48

## 2019-09-30 RX ADMIN — LATANOPROST SCH DROP: 50 SOLUTION OPHTHALMIC at 22:47

## 2019-09-30 RX ADMIN — ROSUVASTATIN CALCIUM SCH MG: 10 TABLET, FILM COATED ORAL at 22:47

## 2019-09-30 NOTE — REPVR
PROCEDURE INFORMATION: 

Exam: US Duplex Bilateral Extracranial Arteries 

Exam date and time: 9/30/2019 8:16 PM 

Clinical history: 84 years old, male; Syncope and collapse 



TECHNIQUE: 

Imaging protocol: Real-time Duplex ultrasound scan of the bilateral carotid and 

vertebral arteries combining gray scale, color Doppler and spectral waveform 

analysis. Bilateral exam. 



COMPARISON: 

No relevant prior studies available. 



FINDINGS: 

Right common carotid artery: Unremarkable. No occlusion or stenosis. Waveforms 

are normal.  

Right internal carotid artery: Mild to moderate mixed atherosclerotic changes. 

No significant elevation in diastolic velocities. No occlusion or stenosis. 

Waveforms are normal.  

Right ICA/CCA ratio: Within normal limits. 0.61

Right external carotid artery: No stenosis in the origin. 

Right vertebral artery: Unremarkable. Antegrade flow 



Left common carotid artery: Mild atherosclerotic changes in the distal segment. 

Otherwise unremarkable. No occlusion or stenosis. Waveforms are normal. 

Left internal carotid artery: Mild to moderate mixed atherosclerotic changes. 

No significant increase in diastolic velocities. No occlusion or stenosis. 

Waveforms are normal. 

Left ICA/CCA ratio: Within normal limits. 0.37.

Left external carotid artery: No stenosis in the origin. 

Left vertebral artery: Unremarkable. Antegrade flow. 



IMPRESSION: 

Mild to moderate bilateral atherosclerotic changes in the proximal internal 

carotid arteries bilaterally with mild atherosclerotic changes in the distal 

left common carotid artery. Stenoses are less than 50% bilaterally at the 

carotid bifurcations consistent with a mild stenosis using SRU criteria. 



COMMENT: 

Carotid Stenosis Reference using SRU criteria: Mild: less than 50% stenosis. 

ICA PSV is less than 125 cm/second and plaque or intimal thickening is visible. 

Moderate: 50-69% stenosis. ICA PSV is 125 to 230 cm/second and plaque is 

visible. Severe: 70-94% stenosis. ICA PSV is more than 230 cm/second and 

visible plaque and lumen narrowing are seen. Near occlusion: 95-99% stenosis. 

ICA PSV is variable and significant plaque and luminal narrowing are seen. 

Occluded: 100% stenosis. No flow identified. 



Electronically signed by: Rafael Weinberg On 09/30/2019  20:50:26 PM

## 2019-09-30 NOTE — REP
CHEST, SINGLE VIEW:

 

Single view of the chest is performed and compared to a prior study of

07/10/2019.

 

No acute infiltrate is seen.  There is mild elevation of the left hemidiaphragm

with crowded lung markings in each lung base.  Heart does not appear to be

significantly enlarged.  Mediastinal silhouette is unchanged.  There are

degenerative changes of the spine.

 

IMPRESSION:

 

No acute infiltrate.

 

 

Electronically Signed by

Joaquín Rosas MD 10/01/2019 04:40 P

## 2019-09-30 NOTE — ECGEPIP
[facility] - ED

                                       

                                       Test Date:    2019

Pat Name:     KEITH DAILY          Department:   

Patient ID:   [id number]                 Room:         -

Gender:       Male                     Technician:   GABINO

:          1935               Requested By: Margie Storm 

Order Number: [id number]     Reading MD:   Margie Storm

                                 Measurements

Intervals                              Axis          

Rate:         65                       P:            

NC:           0                        QRS:          55

QRSD:         97                       T:            -1

QT:           380                                    

QTc:          397                                    

                           Interpretive Statements

ATRIAL FIBRILLATION

ABNORMAL RHYTHM ECG

NSTTW abnormalities

SIMILAR 7/10/19

Electronically Signed on 2019 21:01:37 EDT by Margie Storm

## 2019-10-01 VITALS — SYSTOLIC BLOOD PRESSURE: 131 MMHG | DIASTOLIC BLOOD PRESSURE: 79 MMHG

## 2019-10-01 VITALS — SYSTOLIC BLOOD PRESSURE: 130 MMHG | DIASTOLIC BLOOD PRESSURE: 79 MMHG

## 2019-10-01 VITALS — SYSTOLIC BLOOD PRESSURE: 115 MMHG | DIASTOLIC BLOOD PRESSURE: 64 MMHG

## 2019-10-01 LAB
BASOPHILS # BLD AUTO: 0 10^3/UL (ref 0–0.2)
BASOPHILS NFR BLD AUTO: 0.3 % (ref 0–1)
BUN SERPL-MCNC: 21 MG/DL (ref 7–18)
CALCIUM SERPL-MCNC: 9 MG/DL (ref 8.8–10.2)
CHLORIDE SERPL-SCNC: 107 MEQ/L (ref 98–107)
CO2 SERPL-SCNC: 30 MEQ/L (ref 21–32)
CREAT SERPL-MCNC: 1.17 MG/DL (ref 0.7–1.3)
EOSINOPHIL # BLD AUTO: 0.1 10^3/UL (ref 0–0.5)
EOSINOPHIL NFR BLD AUTO: 1.3 % (ref 0–3)
GFR SERPL CREATININE-BSD FRML MDRD: > 60 ML/MIN/{1.73_M2} (ref 35–?)
GLUCOSE SERPL-MCNC: 113 MG/DL (ref 70–100)
HCT VFR BLD AUTO: 34.1 % (ref 42–52)
HGB BLD-MCNC: 11.5 G/DL (ref 13.5–17.5)
LYMPHOCYTES # BLD AUTO: 2.9 10^3/UL (ref 1.5–5)
LYMPHOCYTES NFR BLD AUTO: 47.2 % (ref 24–44)
MAGNESIUM SERPL-MCNC: 2.2 MG/DL (ref 1.8–2.4)
MCH RBC QN AUTO: 32 PG (ref 27–33)
MCHC RBC AUTO-ENTMCNC: 33.7 G/DL (ref 32–36.5)
MCV RBC AUTO: 95 FL (ref 80–96)
MONOCYTES # BLD AUTO: 0.5 10^3/UL (ref 0–0.8)
MONOCYTES NFR BLD AUTO: 8.4 % (ref 0–5)
NEUTROPHILS # BLD AUTO: 2.6 10^3/UL (ref 1.5–8.5)
NEUTROPHILS NFR BLD AUTO: 42.5 % (ref 36–66)
PLATELET # BLD AUTO: 127 10^3/UL (ref 150–450)
POTASSIUM SERPL-SCNC: 3.8 MEQ/L (ref 3.5–5.1)
RBC # BLD AUTO: 3.59 10^6/UL (ref 4.3–6.1)
SODIUM SERPL-SCNC: 141 MEQ/L (ref 136–145)
WBC # BLD AUTO: 6.2 10^3/UL (ref 4–10)

## 2019-10-01 RX ADMIN — MEMANTINE SCH MG: 5 TABLET ORAL at 21:39

## 2019-10-01 RX ADMIN — OMEPRAZOLE SCH MG: 20 CAPSULE, DELAYED RELEASE ORAL at 08:32

## 2019-10-01 RX ADMIN — INSULIN LISPRO SCH UNITS: 100 INJECTION, SOLUTION INTRAVENOUS; SUBCUTANEOUS at 08:31

## 2019-10-01 RX ADMIN — DORZOLAMIDE HYDROCHLORIDE AND TIMOLOL MALEATE SCH DROP: 20; 5 SOLUTION/ DROPS OPHTHALMIC at 08:33

## 2019-10-01 RX ADMIN — LATANOPROST SCH DROP: 50 SOLUTION OPHTHALMIC at 21:38

## 2019-10-01 RX ADMIN — ASPIRIN SCH MG: 81 TABLET ORAL at 08:32

## 2019-10-01 RX ADMIN — LISINOPRIL SCH MG: 5 TABLET ORAL at 08:32

## 2019-10-01 RX ADMIN — BRIMONIDINE TARTRATE SCH DROP: 1 SOLUTION/ DROPS OPHTHALMIC at 21:38

## 2019-10-01 RX ADMIN — ROSUVASTATIN CALCIUM SCH MG: 10 TABLET, FILM COATED ORAL at 21:39

## 2019-10-01 RX ADMIN — DONEPEZIL HYDROCHLORIDE SCH MG: 5 TABLET, FILM COATED ORAL at 21:42

## 2019-10-01 RX ADMIN — INSULIN LISPRO SCH UNITS: 100 INJECTION, SOLUTION INTRAVENOUS; SUBCUTANEOUS at 17:44

## 2019-10-01 RX ADMIN — MEMANTINE SCH MG: 5 TABLET ORAL at 08:32

## 2019-10-01 RX ADMIN — FOLIC ACID SCH MG: 1 TABLET ORAL at 08:32

## 2019-10-01 RX ADMIN — BRIMONIDINE TARTRATE SCH DROP: 1 SOLUTION/ DROPS OPHTHALMIC at 08:33

## 2019-10-01 RX ADMIN — ENOXAPARIN SODIUM SCH MG: 40 INJECTION SUBCUTANEOUS at 08:32

## 2019-10-01 RX ADMIN — INSULIN LISPRO SCH UNITS: 100 INJECTION, SOLUTION INTRAVENOUS; SUBCUTANEOUS at 12:00

## 2019-10-01 NOTE — REP
CT angiography of the neck with IV contrast:

 

History:  Gait ataxia, rule out CVA.  Atrial fibrillation.

 

CT angiographic findings:  There is mild vascular calcification in the posterior

aortic arch.  The left vertebral artery takes a direct aortic origin as a normal

variant.  There is mild calcification in the proximal left vertebral artery

without significant stenosis.  Vertebral arteries are widely patent.  The right

vertebral artery origin is calcific fairly heavily.  There is also fairly heavy

vascular calcification in the distal innominate artery without high grade

stenosis.  The common carotid arteries are unremarkable and symmetric.  Moderate

vascular calcification is seen in the carotid bifurcations with less than 50%

narrowing is of the internal carotid arteries bilaterally.  There is some

calcification in the carotid siphons bilaterally.  No high-grade stenosis is

seen.  The internal carotid arteries are bilaterally tortuous, particularly on

the right.  Study is otherwise unremarkable.

 

Impression:

 

Direct aortic origin left vertebral artery with some calcific plaquing.  Heavy

calcific plaquing at the origin of the right vertebral artery.  Less than 50%

narrowing of the internal carotid arteries bilaterally.  Otherwise negative.

 

 

Electronically Signed by

Carlitos Lindsey MD 10/02/2019 07:45 A

## 2019-10-01 NOTE — REP
CT ANGIO of the brain with IV contrast:

 

History:  Gait ataxia, rule out cerebellar CVA.  Atrial fibrillation.

 

CT contrast dose:  100 ml of intravenous Isovue 370 is administered.

 

CT findings:  Distal vertebral and distal internal carotid arteries are normal

and symmetric.  There is some vascular calcification in the carotid siphons

bilaterally, mild in degree.  No evidence of high-grade stenosis is seen.  The

anterior and middle cerebral arteries are unremarkable.  Posterior cerebral and

superior cerebellar vessels are unremarkable.  There is no CT angiographic

evidence to suggest intravenous arteriovascular malformation.  The dural sinuses

are unremarkable and patent bilaterally.

 

Impression:

 

 Vascular calcification in the carotid siphons bilaterally.  No evidence of

arteriovenous malformation or berry aneurysm.

 

 

Electronically Signed by

Carlitos Lindsey MD 10/02/2019 07:45 A

## 2019-10-01 NOTE — EEG
DATE OF PROCEDURE:  10/01/2019

 

REFERRING PHYSICIAN:  Dr. Marisol Hunter

 

DIAGNOSIS:  Syncope versus seizure.

 

EEG NUMBER:  

 

HISTORY:  The patient is an 84-year-old man with a history of dementia, diabetes,

sleep apnea, hypertension, heart disease and syncopal events.  He was admitted at

[facility] due to passing out spell.  This EEG was done to rule out

epileptic potential.  He is currently taking aspirin, quetiapine, Crestor,

Aricept, Namenda, etc.

 

TECHNICAL DESCRIPTION:  This digital EEG was recorded by 21 scalp, ear and two

EKG electrodes and was reviewed in bipolar and referential montages following

reformatting in 10-20 international electrode placement system.

 

INTERPRETATION:  The patient was noted to be in awake and drowsy states during

this EEG.  Resting awake background rhythm consisted of a 8-9 Hz alpha activity

measuring 15-40 microvolts in amplitude, which was symmetric and reactive to eye

opening.  Stage I and II sleep were reviewed and were symmetric bilaterally.

Intermittent theta slowing was noted in bilateral temporal head region.

Hyperventilation could not be performed.  Photic stimulation remained

unremarkable.  EKG revealed irregular heartbeat and possible atrial fibrillation.

No focal, lateralizing or epileptiform abnormalities were seen.  No relevant

clinical activity was noted.

 

CONCLUSION:  This EEG in awake, drowsy states, stage I and II sleep is mildly

abnormal due to presence of mild intermittent bilateral temporal slowing

consistent with nonspecific diffuse cerebral dysfunction such as seen in dementia

and encephalopathy.  EKG revealed irregular heart beat and possible atrial

fibrillation.  No clear P-waves were seen.  A 12-lead EKG is recommended.

Clinical correlation is advised.

## 2019-10-01 NOTE — IPN
DATE:   10/01/2019

 

Per telemetry technician, the patient remained in atrial fibrillation and had an

episode of 2.2 sinus pause. The patient was asymptomatic with blood pressure 
well

maintained during this event.  He currently denies any shortness of breath, 
chest

pain, pressure or tightness, palpitations, lightheadedness, or dizziness.  He

complains of generalized weakness.  Per the wife present at the bedside, he has

had worsening confusion.  Currently on Namenda and donepezil.  The patient at

times does not remember his daughter and after a few minutes will say "Oh yes, I

know her."

 

OBJECTIVE:

PHYSICAL EXAMINATION:

VITAL SIGNS:  Temperature 97, pulse 79 to 95, respiratory rate 18, blood 
pressure

131/74, 96% on room air.

GENERAL:  The patient is awake, alert, oriented to himself.  He is in no

respiratory distress.  No use of respiratory accessory muscles.  No jugular

venous distention (JVD).  He follows commands, easily arousable.  The patient is

confused and disoriented to time and place.

NECK:  Supple.  No carotid bruits.  No cervical lymphadenopathy.

LUNGS:  Clear to auscultation.  No wheezing, rales or rhonchi.  Air entry is

equal bilaterally.

HEART:  S1, S2.  Irregularly irregular.

ABDOMEN:  Soft, nontender, nondistended.  Positive bowel sounds times four

quadrants.  No rebound or guarding.  There are no abdominal bruits.

EXTREMITIES:  No cyanosis or clubbing.

SKIN:  Pink in color and warm to touch.

 

LABORATORY DATA:

White count 6.2, hemoglobin 11, hematocrit 34, platelet count 127.  Sodium 141,

potassium 3.8, chloride 107, bicarbonate 30, BUN 21, creatinine 1.17, glucose of

113, magnesium of 2.2, TSH 3.8.  Troponin less than 0.07.

 

IMAGING STUDIES:

Chest x-ray showed no acute infiltrate.  Carotid Dopplers showed mild to 
moderate

bilateral atherosclerotic changes in proximal internal carotid arteries

bilaterally with mild atherosclerotic changes in distal left common carotid

artery, stenosis less than 50% bilaterally at the carotid bifurcation consistent

with mild stenosis.

 

ASSESSMENT AND PLAN:   This is an 84-year-old male with second admission for

syncopal episode and collapse, initially noted in July and was discharged home.

Per the patient's wife, he has had recurrent falls and syncopal episodes at home

with concerns for his safety, as he has fallen in the kitchen with granite

countertops.  THe patient is currently here for placement and evaluation for his

syncopal episode.

 

1.  Syncope.  The patient remains on telemetry and was found to have atrial

fibrillation, which is rate controlled, episodes of sinus pause 2.2 seconds,

which was asymptomatic while he was lying down and sleeping on the bed.  He

remained hemodynamically stable.  We will continue to monitor on telemetry and 
we

will consult cardiology if the patient will be eligible with his advanced

dementia, recurrent falls for a pacer placement if the patient has recurrent

episodes of syncope and sinus pauses.

 

Due to sinus pause, the patient's Timolol has been discontinued.

 

2.  Alzheimer's dementia and possible vascular dementia with history of CVA 
times

two.  The patient cannot be sent for MRI due to hip replacements in the past.  
We

will obtain CT angio of the head and neck regarding his syncopal episodes.

 

3.  Atrial fibrillation with sinus pauses, currently not on medications for rate

control.  We will continue to monitor on telemetry.  Possible sick sinus 
syndrome

and may need a pacemaker if the patient continues to have sinus pauses or

bradycardia.

 

4.  Type 2 diabetes, not on medications currently.  Check A1/c and continue with

sliding scale.

 

5.  Glaucoma.  Timolol has been discontinued due to sinus pause.

 

6.  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

injuries with chronic memory deficits.  Followed by Dr. Suggs as an

outpatient.  We will consult if CTA has new findings.

 

7.  Deep vein thrombosis (DVT) prophylaxis with compression stockings.

 

8.  Placement.  The patient's wife is unable to care for him at home.  THe

patient will need social work for [facility] discharge.

LOUIS

## 2019-10-02 VITALS — DIASTOLIC BLOOD PRESSURE: 71 MMHG | SYSTOLIC BLOOD PRESSURE: 130 MMHG

## 2019-10-02 VITALS — SYSTOLIC BLOOD PRESSURE: 109 MMHG | DIASTOLIC BLOOD PRESSURE: 61 MMHG

## 2019-10-02 VITALS — DIASTOLIC BLOOD PRESSURE: 81 MMHG | SYSTOLIC BLOOD PRESSURE: 118 MMHG

## 2019-10-02 VITALS — DIASTOLIC BLOOD PRESSURE: 69 MMHG | SYSTOLIC BLOOD PRESSURE: 135 MMHG

## 2019-10-02 LAB
BASOPHILS # BLD AUTO: 0 10^3/UL (ref 0–0.2)
BASOPHILS NFR BLD AUTO: 0.3 % (ref 0–1)
BUN SERPL-MCNC: 20 MG/DL (ref 7–18)
CALCIUM SERPL-MCNC: 8.5 MG/DL (ref 8.8–10.2)
CHLORIDE SERPL-SCNC: 108 MEQ/L (ref 98–107)
CO2 SERPL-SCNC: 29 MEQ/L (ref 21–32)
CREAT SERPL-MCNC: 1.22 MG/DL (ref 0.7–1.3)
EOSINOPHIL # BLD AUTO: 0.1 10^3/UL (ref 0–0.5)
EOSINOPHIL NFR BLD AUTO: 1.1 % (ref 0–3)
FT4I SERPL CALC-MCNC: 1.9 % (ref 1.4–3.8)
GFR SERPL CREATININE-BSD FRML MDRD: > 60 ML/MIN/{1.73_M2} (ref 35–?)
GLUCOSE SERPL-MCNC: 109 MG/DL (ref 70–100)
HCT VFR BLD AUTO: 35.2 % (ref 42–52)
HGB BLD-MCNC: 11.6 G/DL (ref 13.5–17.5)
LYMPHOCYTES # BLD AUTO: 3.1 10^3/UL (ref 1.5–5)
LYMPHOCYTES NFR BLD AUTO: 46.4 % (ref 24–44)
MCH RBC QN AUTO: 31.2 PG (ref 27–33)
MCHC RBC AUTO-ENTMCNC: 33 G/DL (ref 32–36.5)
MCV RBC AUTO: 94.6 FL (ref 80–96)
MONOCYTES # BLD AUTO: 0.5 10^3/UL (ref 0–0.8)
MONOCYTES NFR BLD AUTO: 7.3 % (ref 0–5)
NEUTROPHILS # BLD AUTO: 3 10^3/UL (ref 1.5–8.5)
NEUTROPHILS NFR BLD AUTO: 44.7 % (ref 36–66)
PLATELET # BLD AUTO: 139 10^3/UL (ref 150–450)
POTASSIUM SERPL-SCNC: 4 MEQ/L (ref 3.5–5.1)
RBC # BLD AUTO: 3.72 10^6/UL (ref 4.3–6.1)
SODIUM SERPL-SCNC: 142 MEQ/L (ref 136–145)
T3RU NFR SERPL: 35 % (ref 33–40)
T4 SERPL-MCNC: 5.5 UG/DL (ref 4.5–12)
TSH SERPL DL<=0.005 MIU/L-ACNC: 2.1 UIU/ML (ref 0.36–3.74)
WBC # BLD AUTO: 6.6 10^3/UL (ref 4–10)

## 2019-10-02 RX ADMIN — BRIMONIDINE TARTRATE SCH DROP: 1 SOLUTION/ DROPS OPHTHALMIC at 21:00

## 2019-10-02 RX ADMIN — MEMANTINE SCH MG: 5 TABLET ORAL at 21:24

## 2019-10-02 RX ADMIN — DORZOLAMIDE HYDROCHLORIDE SCH DROP: 20 SOLUTION/ DROPS OPHTHALMIC at 21:26

## 2019-10-02 RX ADMIN — MEMANTINE SCH MG: 5 TABLET ORAL at 08:34

## 2019-10-02 RX ADMIN — LATANOPROST SCH DROP: 50 SOLUTION OPHTHALMIC at 21:24

## 2019-10-02 RX ADMIN — OMEPRAZOLE SCH MG: 20 CAPSULE, DELAYED RELEASE ORAL at 08:35

## 2019-10-02 RX ADMIN — BRIMONIDINE TARTRATE SCH DROP: 1 SOLUTION/ DROPS OPHTHALMIC at 08:35

## 2019-10-02 RX ADMIN — ENOXAPARIN SODIUM SCH MG: 40 INJECTION SUBCUTANEOUS at 08:34

## 2019-10-02 RX ADMIN — INSULIN LISPRO SCH UNITS: 100 INJECTION, SOLUTION INTRAVENOUS; SUBCUTANEOUS at 08:34

## 2019-10-02 RX ADMIN — LATANOPROST SCH DROP: 50 SOLUTION OPHTHALMIC at 21:00

## 2019-10-02 RX ADMIN — ASPIRIN SCH MG: 81 TABLET ORAL at 08:35

## 2019-10-02 RX ADMIN — INSULIN LISPRO SCH UNITS: 100 INJECTION, SOLUTION INTRAVENOUS; SUBCUTANEOUS at 12:39

## 2019-10-02 RX ADMIN — FOLIC ACID SCH MG: 1 TABLET ORAL at 08:34

## 2019-10-02 RX ADMIN — LISINOPRIL SCH MG: 5 TABLET ORAL at 08:39

## 2019-10-02 RX ADMIN — ROSUVASTATIN CALCIUM SCH MG: 10 TABLET, FILM COATED ORAL at 21:23

## 2019-10-02 RX ADMIN — BRIMONIDINE TARTRATE SCH DROP: 1 SOLUTION/ DROPS OPHTHALMIC at 21:24

## 2019-10-02 RX ADMIN — INSULIN LISPRO SCH UNITS: 100 INJECTION, SOLUTION INTRAVENOUS; SUBCUTANEOUS at 16:50

## 2019-10-02 NOTE — IPNPDOC
Date Seen


The patient was seen on 10/2/19.





Progress Note


SUBJECTIVE: 


pt c/o pain, swelling at the base of the left indexfinger mcp joint with 

difficulty flexing and extending it. no fever or chills. pt is due for


remicaide infusion as outpt by his rheumatologist. He remains unsteady on his 

feet, but denies sob, chest pain,palpitations, lightheadedness. 


Despite tele showing sinus pause of 2.2 seconds yesterday, pt was asymptomatic 

with no hemodynamic compromise.  CT Head angio: no cerebellar CVA.


CT neck: heavy calcification in left vertebral artery. <50% carotid artery 

stenoses b/l. 





OBJECTIVE:


Physical Examination


VITALS: PLS SEE BELOW


General Exam: sitting on chair next to his bed. no cyanosis, face is symmetric, 

no respiratory distress


HEENT: no JVD, no thyromegaly


LUNGS: Clear to auscultation,AEBE, No wheezing rales 


HEART: Irregularly irregular. 


Telemetry:  2.2 second sinus pause 10/1/19.  Atrial fibrillation 80-97


Abdomen Positive:bowel sounds x 4 quadrants, Soft nonTender, no 

Hepatospenomegaly


Extremity: (+) Edema; Left index MCP joint swelling, tender, warm  unable to 

flex and extend due to pain


Neuro: face is symmetric. AAO to person only. gait not tested 5/5 strength . 

tongue midline.





LABORATORY DATA, IMAGING STUDIES, MICROBIOLOGY: PLS SEE BELOW





ASSESSMENT AND PLAN: 


84 year old male with chronic Atrial fibrillation not on any anticoagulation, 

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 Essential hypertension.Obstructive sleep 

apnea, Non-insulin-dependent diabetes mellitus, Glaucoma, Rheumatoid arthritis 

on symponi and methotrexate, History of traumatic brain injury in 1950s, 

Gastroesophageal reflux disease, Osteoarthritis was brought to ED for an episode

of syncope with similar presentation in July. 





Syncope


Tele: Afib rate controlled on no meds, sinus pause 2.2 HD stable 10/2/19, no 

pacer indication per Dr. Garza unless 5seconds with HD compromise.


Orthostatic on admission with no recurrent syncope. discontinued timolol eye 

drops which can cause hypotension. 


Echo: unremarkable for source of syncope.





Sinus Pause 2.2 seconds


adverse effect of Donepezil given at the correct dose and frequency


discussed with both Dr. Garza cardiologist on call and Dr. Escobar, neurologist, 

both of whom agree with discontinuation of the drug. 





Orthostatic Hypotension


encouraged po fluid intake


discussed with Dr. Felton , pt's ophthalmologist in [place], [phone] who 

agrees with discontinuing timolol eye drops which can cause hypotension. 





Gait Imbalance


CT head angio: heavy calcification left vertebral artery. no cerebellar stroke


CT neck: <50% carotid artery stenosis





Chronic Afib


EKG shows A fib with controlled heart rate on no meds 





Advanced dementia, vascular versus Alzheimer's


with depression


with episodes of agitation in unknown places. 


continue Memantine,  seroquel 


Wife requesting placement


Dr. Escobar consulted for worsening dementia for adjustment of meds. 





Dyslipidemia.


continue stain





Essential hypertension.





Obstructive sleep apnea





Non-insulin-dependent diabetes mellitus.


will hold metformin due to recent contrast studies. 


on insulin





Glaucoma.


continue eye drops 


discontinued timolol due to hypotension





Rheumatoid arthritis.


on methotrexate and biologics. 





History of traumatic brain injury.





Gastroesophageal reflux disease.


on PPI. 





Osteoarthritis


symptomatic management





left index mcp joint swelling.


warm compresses


xray: subluxation. will discuss with hand surgery if needs a splint.


short course of prednisone


will discuss with pt's rheumatologist Dr. Damian [phone]





disposition: [facility] placement per family request.





VS, I&O, 24H, Fishbone


Vital Signs/I&O





Vital Signs








  Date Time  Temp Pulse Resp B/P (MAP) Pulse Ox O2 Delivery O2 Flow Rate FiO2


 


10/2/19 12:00 97.1 72 19 135/69 (91) 95   


 


9/30/19 20:42      Room Air  














I&O- Last 24 Hours up to 6 AM 


 


 10/2/19





 06:00


 


Intake Total 620 ml


 


Output Total 100 ml


 


Balance 520 ml











Laboratory Data


24H LABS


Laboratory Tests 2


10/2/19 05:58: 


Immature Granulocyte % (Auto) 0.2, White Blood Count 6.6, Red Blood Count 3.72L,

Hemoglobin 11.6L, Hematocrit 35.2L, Mean Corpuscular Volume 94.6, Mean 

Corpuscular Hemoglobin 31.2, Mean Corpuscular Hemoglobin Concent 33.0, Red Cell 

Distribution Width 14.4, Platelet Count 139L, Neutrophils (%) (Auto) 44.7, 

Lymphocytes (%) (Auto) 46.4H, Monocytes (%) (Auto) 7.3H, Eosinophils (%) (Auto) 

1.1, Basophils (%) (Auto) 0.3, Neutrophils # (Auto) 3.0, Lymphocytes # (Auto) 

3.1, Monocytes # (Auto) 0.5, Eosinophils # (Auto) 0.1, Basophils # (Auto) 0.0, 

Nucleated Red Blood Cells % (auto) 0.0, Anion Gap 5L, Glomerular Filtration Rate

> 60.0, Blood Urea Nitrogen 20H, Creatinine 1.22, Sodium Level 142, Potassium 

Level 4.0, Chloride Level 108H, Carbon Dioxide Level 29, Calcium Level 8.5L, 

Thyroid Stimulating Hormone (TSH) 2.100, Free Thyroxine Index 1.9, Thyroxine 

(T4) 5.5, Triiodothyronine (T3) Uptake 35


10/2/19 11:28: Bedside Glucose (Misc Panel) 157H


10/2/19 13:57: 


Urine Color YELLOW, Urine Appearance HAZY, Urine pH 5.0, Urine Specific Gravity 

1.039, Urine Protein NEGATIVE, Urine Glucose (UA) NEGATIVE, Urine Ketones 

NEGATIVE, Urine Blood NEGATIVE, Urine Nitrite NEGATIVE, Urine Bilirubin 

NEGATIVE, Urine Urobilinogen 0.2, Urine Leukocyte Esterase NEGATIVE, Urine WBC 

(Auto) 1, Urine RBC (Auto) 1, Urine Hyaline Casts (Auto) 0, Urine Bacteria 

(Auto) NEGATIVE, Urine Squamous Epithelial Cells 0, Urine Granular Casts (Auto) 

1, Urine Mucus (Auto) SMALL, Urine Sperm (Auto) 


10/2/19 16:25: Bedside Glucose (Misc Panel) 82L


CBC/BMP


Laboratory Tests


10/2/19 05:58








Red Blood Count 3.72 L, Mean Corpuscular Volume 94.6, Mean Corpuscular 

Hemoglobin 31.2, Mean Corpuscular Hemoglobin Concent 33.0, Red Cell Distribution

Width 14.4, Neutrophils (%) (Auto) 44.7, Lymphocytes (%) (Auto) 46.4 H, 

Monocytes (%) (Auto) 7.3 H, Eosinophils (%) (Auto) 1.1, Basophils (%) (Auto) 

0.3, Neutrophils # (Auto) 3.0, Lymphocytes # (Auto) 3.1, Monocytes # (Auto) 0.5,

Eosinophils # (Auto) 0.1, Basophils # (Auto) 0.0, Calcium Level 8.5 L











LANNY ALVAREZ MD             Oct 2, 2019 17:31

## 2019-10-02 NOTE — REP
Left hand four views:

There are no comparisons.

There is demineralization.

There is osteoarthritis of the PIP and DIP articulations.

 

There is osteoarthritis of the index finger MCP articulation.  There is slight

subluxation of the index finger proximal phalange base radially the head of the

metacarpal.  There are calcifications along the ulnar aspect of the index finger

metacarpal head.  These may be  post-traumatic changes.

 

There is osteoarthritis at the thumb trapezial metacarpal joint.

There is no acute fracture or dislocation.

 

Impression:

Osteoarthritis as described.

Changes at the index finger MCP articulation as described.

 

 

Electronically Signed by

Joaquín Romo MD 10/02/2019 01:25 P

## 2019-10-02 NOTE — ECHO
DATE OF STUDY:  09/30/2019

REFERRING PHYSICIAN:  Dr. Marisol Hunter

INDICATION:  Syncope.

HEIGHT:  173 cm.

WEIGHT:  104 kg.

 

DIMENSIONS:

IVS:  1.3

LV:  3.8

LVPW:  1.2

LA:  4.1

Aorta:  3.7

IVC:  1.8

 

FINDINGS:

The study is of acceptable technical quality even though apical views were

somewhat limited.  The patient is in atrial fibrillation with controlled rate.

 

The left ventricle is of normal size and systolic function, I estimate ejection

fraction (EF) around 60%, but the visualization was limited and I cannot

completely rule out subtle wall motion abnormalities.  Mild left ventricular

hypertrophy (LVH) is present.  The right ventricle appears to be normal size and

systolic function.  Both atria are severely enlarged.  The aortic valve is

sclerotic, but it has three cusps and grossly preserved mobility.  Mitral and

tricuspid valves appear normal.  Pulmonic valve was not seen.  No pericardial

effusion is noted.  Inferior vena cava is of normal caliber.  Aortic root, 
aortic

arch and visualized segment of abdominal aorta appear normal.

 

Doppler interrogation of aortic valve reveals no stenosis or insufficiency.  The

mitral valve is functionally competent as well.  There is trace tricuspid

insufficiency.  Calculated pulmonary artery pressure was within normal limits

based on poor quality of TR jet.  This should not be considered overly reliable.

 

Evaluation of diastolic function is inconclusive due to underlying atrial

fibrillation.

 

CONCLUSIONS:

1.  Study is of acceptable technical quality.

2.  Normal LV size with mild LVH and grossly preserved LV systolic function.

3.  Aortic sclerosis, but no stenosis or insufficiency.

4.  No significant mitral and tricuspid valvular disease.

5.  Likely normal central venous pressure and probably normal pulmonary artery

pressure.

6.  Severe biatrial enlargement. 

 

COMMENT:  Subacute bacterial endocarditis (SBE) prophylaxis is not recommended.

The study is suggestive of chronic atrial fibrillation, but does not provide

obvious explanation for syncopal event.

[facility]
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
## 2019-10-03 VITALS — DIASTOLIC BLOOD PRESSURE: 61 MMHG | SYSTOLIC BLOOD PRESSURE: 95 MMHG

## 2019-10-03 VITALS — SYSTOLIC BLOOD PRESSURE: 141 MMHG | DIASTOLIC BLOOD PRESSURE: 89 MMHG

## 2019-10-03 VITALS — DIASTOLIC BLOOD PRESSURE: 68 MMHG | SYSTOLIC BLOOD PRESSURE: 104 MMHG

## 2019-10-03 VITALS — DIASTOLIC BLOOD PRESSURE: 78 MMHG | SYSTOLIC BLOOD PRESSURE: 132 MMHG

## 2019-10-03 VITALS — SYSTOLIC BLOOD PRESSURE: 130 MMHG | DIASTOLIC BLOOD PRESSURE: 79 MMHG

## 2019-10-03 LAB
BASOPHILS # BLD AUTO: 0 10^3/UL (ref 0–0.2)
BASOPHILS NFR BLD AUTO: 0.1 % (ref 0–1)
BUN SERPL-MCNC: 26 MG/DL (ref 7–18)
CALCIUM SERPL-MCNC: 9 MG/DL (ref 8.8–10.2)
CHLORIDE SERPL-SCNC: 107 MEQ/L (ref 98–107)
CO2 SERPL-SCNC: 27 MEQ/L (ref 21–32)
CREAT SERPL-MCNC: 1.21 MG/DL (ref 0.7–1.3)
EOSINOPHIL # BLD AUTO: 0 10^3/UL (ref 0–0.5)
EOSINOPHIL NFR BLD AUTO: 0.1 % (ref 0–3)
GFR SERPL CREATININE-BSD FRML MDRD: > 60 ML/MIN/{1.73_M2} (ref 35–?)
GLUCOSE SERPL-MCNC: 133 MG/DL (ref 70–100)
HCT VFR BLD AUTO: 35.2 % (ref 42–52)
HCT VFR BLD AUTO: 37.3 % (ref 42–52)
HGB BLD-MCNC: 11.7 G/DL (ref 13.5–17.5)
HGB BLD-MCNC: 12.3 G/DL (ref 13.5–17.5)
LYMPHOCYTES # BLD AUTO: 2 10^3/UL (ref 1.5–5)
LYMPHOCYTES NFR BLD AUTO: 26.7 % (ref 24–44)
MCH RBC QN AUTO: 30.9 PG (ref 27–33)
MCH RBC QN AUTO: 31.5 PG (ref 27–33)
MCHC RBC AUTO-ENTMCNC: 33 G/DL (ref 32–36.5)
MCHC RBC AUTO-ENTMCNC: 33.2 G/DL (ref 32–36.5)
MCV RBC AUTO: 92.9 FL (ref 80–96)
MCV RBC AUTO: 95.6 FL (ref 80–96)
MONOCYTES # BLD AUTO: 0.4 10^3/UL (ref 0–0.8)
MONOCYTES NFR BLD AUTO: 5.8 % (ref 0–5)
NEUTROPHILS # BLD AUTO: 5.1 10^3/UL (ref 1.5–8.5)
NEUTROPHILS NFR BLD AUTO: 67 % (ref 36–66)
PLATELET # BLD AUTO: 145 10^3/UL (ref 150–450)
PLATELET # BLD AUTO: 155 10^3/UL (ref 150–450)
POTASSIUM SERPL-SCNC: 4.2 MEQ/L (ref 3.5–5.1)
RBC # BLD AUTO: 3.79 10^6/UL (ref 4.3–6.1)
RBC # BLD AUTO: 3.9 10^6/UL (ref 4.3–6.1)
SODIUM SERPL-SCNC: 140 MEQ/L (ref 136–145)
WBC # BLD AUTO: 7.6 10^3/UL (ref 4–10)
WBC # BLD AUTO: 8.9 10^3/UL (ref 4–10)

## 2019-10-03 RX ADMIN — LISINOPRIL SCH MG: 5 TABLET ORAL at 08:46

## 2019-10-03 RX ADMIN — DORZOLAMIDE HYDROCHLORIDE SCH DROP: 20 SOLUTION/ DROPS OPHTHALMIC at 08:47

## 2019-10-03 RX ADMIN — MEMANTINE SCH MG: 5 TABLET ORAL at 20:30

## 2019-10-03 RX ADMIN — DORZOLAMIDE HYDROCHLORIDE SCH DROP: 20 SOLUTION/ DROPS OPHTHALMIC at 20:31

## 2019-10-03 RX ADMIN — OMEPRAZOLE SCH MG: 20 CAPSULE, DELAYED RELEASE ORAL at 08:43

## 2019-10-03 RX ADMIN — LATANOPROST SCH DROP: 50 SOLUTION OPHTHALMIC at 20:31

## 2019-10-03 RX ADMIN — BRIMONIDINE TARTRATE SCH DROP: 1 SOLUTION/ DROPS OPHTHALMIC at 20:31

## 2019-10-03 RX ADMIN — INSULIN LISPRO SCH UNITS: 100 INJECTION, SOLUTION INTRAVENOUS; SUBCUTANEOUS at 12:51

## 2019-10-03 RX ADMIN — ENOXAPARIN SODIUM SCH MG: 40 INJECTION SUBCUTANEOUS at 08:43

## 2019-10-03 RX ADMIN — INSULIN LISPRO SCH UNITS: 100 INJECTION, SOLUTION INTRAVENOUS; SUBCUTANEOUS at 08:44

## 2019-10-03 RX ADMIN — MEMANTINE SCH MG: 5 TABLET ORAL at 08:43

## 2019-10-03 RX ADMIN — INSULIN LISPRO SCH UNITS: 100 INJECTION, SOLUTION INTRAVENOUS; SUBCUTANEOUS at 17:30

## 2019-10-03 RX ADMIN — ROSUVASTATIN CALCIUM SCH MG: 10 TABLET, FILM COATED ORAL at 20:30

## 2019-10-03 RX ADMIN — FOLIC ACID SCH MG: 1 TABLET ORAL at 08:43

## 2019-10-03 RX ADMIN — ASPIRIN SCH MG: 81 TABLET ORAL at 08:43

## 2019-10-03 RX ADMIN — BRIMONIDINE TARTRATE SCH DROP: 1 SOLUTION/ DROPS OPHTHALMIC at 08:42

## 2019-10-03 NOTE — IPN
DATE:  10/03/2019

 

SUBJECTIVE:

Patient still complains of left index finger metacarpophalangeal (MCP) joint

pain.  Improved today with less swelling, less redness.  No fever or chills

overnight.

 

PHYSICAL EXAMINATION:

VITAL SIGNS:  Temperature 98.2, pulse 81, respiratory rate 18, blood pressure

132/78, 95% on room air.

GENERAL:  Patient is awake, alert and oriented to himself, disoriented to time

and place, unable to state the date.  He is cooperative.

Face is symmetric.  The patient has fluent speech.  Tongue is midline.

Neck is supple, full range of motion.  No cervical lymphadenopathy or

thyromegaly.

Lungs clear to auscultation, no wheezes, rales or rhonchi.

Heart S1, S2 irregularly irregular.

Abdomen is obese, soft, nontender, nondistended.

No lower extremity edema.

 

LABORATORY DATA:

White count 7.6, hemoglobin 11, hematocrit 35, platelet count 145.

 

ASSESSMENT/PLAN:

This is an 84-year-old male with history of advanced dementia, Alzheimer's versus

vascular, follows with [facility], dyslipidemia, hypertension,

atrial fibrillation on aspirin, obstructive sleep apnea (JOSE), diabetes,

glaucoma, RA on Simponi and methotrexate, history of traumatic brain injury (TBI)

in 1950s, reflux and arthritis, brought into the ER due to syncopal episode at

home which was similar to presentation in July.

 

CURRENT ISSUES:

1.  Syncope.  Most likely secondary to adverse affect from timolol and Aricept,

both of which have been discontinued.  The patient had a sinus pause of 2.2, but

was hemodynamically stable on 10/02/2019.  No pacer indication per Dr. Hernandez

unless the patient develops hemodynamic compromise, and 5 seconds or more with a

sinus pause.  The patient was orthostatic on admission, but has been much more

stable.  He has been encouraged to take oral intake.  Timolol has been

discontinued.   Echo was unremarkable source of the patient's syncopal episode,

sinus pause as an adverse affect of donepezil given at the correct dose and

frequency.  Discussed with both Dr. Hernandez cardiologist on call and Dr. Escobar,

neurologist, both of them agree with discontinuation of the drug.  No recurrent

sinus pauses on telemetry.

 

2.  Orthostatic hypotension.  The patient's timolol eye drops have been

discontinued with okay by Dr. Felton, the patient's ophthalmologist in [place].

 

 

3.  Gait and balance.  Per neurology, continue all other medications as

previously prescribed.  CT head angio shows heavy calcification of left vertebral

body, no cerebellar stroke. CT neck less than 50% carotid artery stenosis.  The

patient does have chronic small vessel ischemic disease and is continued on

aspirin.

 

4.  Chronic atrial fibrillation.  Currently controlled heart rate on no

medications.  He did have one episode of 120 on telemetry yesterday, but it was

very brief and nonsustained.  May need to start on metoprolol.

 

5.  Dyslipidemia.  On statin medications.

 

6.  Diabetes. The patient's metformin was held due to recent contrast studies.

CT angio of the head and neck.

 

7.  Glaucoma.  The patient's timolol was discontinued, which was okay with the

patient's ophthalmologist.

 

8.  RA.  The patient has a rheumatology followup.

 

9.  Left index MCP joint swelling.  Warm compresses.  X-rays show subluxation.

Short course of prednisone.

## 2019-10-03 NOTE — CR
DATE OF CONSULTATION: 10/02/2019

 

REFERRING PHYSICIAN: Dr. Linda Juarez

 

REASON FOR CONSULTATION: Altered mental status, syncopal episode and difficulty

walking.

 

HISTORY OF PRESENT ILLNESS: Sven Golden is an 84-year-old man with history of

a passing out spell in July 2019, atrial fibrillation, not on anticoagulation,

advanced dementia, incontinence, dyslipidemia, hypertension, traumatic brain

injury (TBI) in 1950s, acid reflux, osteoarthritis, who came back to [facility] due to an episode of syncope at home today.  He had an episode in

July 2019.  He was sitting on a stool in the kitchen when he was baking in the

kitchen and he tried to get up and his wife noted that he was turning grey with

eyelids drooping and he slumped forward to the kitchen counter.  His wife caught

him before he hit his head on the counter.  He was making gurgling noises from

his throat.  His wife leaned him back into the chair and positioned herself in

front of him to prevent him from sliding down to the floor.  He woke up with the

application of cold towels.  He had urinary incontinence.  There was no shaking.

The patient himself is unable to provide any meaningful history. The history was

obtained from electronic medical records and nurses.  The patient himself is

barely able to follow one-step commands.  He is significantly confused and his

speech is filled with paraphasic errors.  He has difficulty understanding and

answering questions.  He was noted to have sinus bradycardia yesterday and had

two cardiac pauses lasting for 2-2 seconds.

 

DIAGNOSTIC STUDIES:

CT scan of head showed moderate cerebral atrophy and small-vessel ischemic

disease of the brain.  CT angiography of the head and neck showed significant

plaque of right vertebral artery and less than 50% bilateral internal carotid

artery stenosis and atherosclerosis.

 

MEDICATIONS:

- aspirin 81 mg by mouth daily

- Aricept 10 mg by mouth daily

- Simponi 200 mg as directed every eight weeks

- lisinopril 10 mg by mouth daily

- Namenda 10 mg by mouth twice a day

- Metformin 5 mg by mouth twice a day

- methotrexate 12.5 mg by mouth once a week

- Myrbetriq 50 mg by mouth daily

- Prilosec 20 mg by mouth daily

- quetiapine 50 mg by mouth twice a day

- brimonidine 0.1% eye drops, one drop in both eyes twice a day

- dorzolamide/Timolol eye drops, one drop both eyes twice a day

- latanoprost  eye drops, one drop both eyes once a day

- Crestor 40 mg by mouth daily

 

ALLERGIES:  NO KNOWN DRUG ALLERGIES.

 

PAST MEDICAL HISTORY:

1. Syncopal episodes.

2. Atrial fibrillation.

3. Advanced dementia.

4. Dyslipidemia.

5. Hypertension.

6. Sleep apnea.

7. Type II diabetes.

8. Glaucoma.

9. Rheumatoid arthritis.

10. TBI.

11. Acid reflux.

12. Osteoarthritis.

13.  Bilateral hip arthroplasty.

14. Bilateral knee replacement.

15. Rotator cuff tear, status post repair.

16. Cataract surgery.

17  Facial surgery.

 

SOCIAL HISTORY: He denies smoking, alcohol or illicit drugs.

 

FAMILY HISTORY: Parents with history of heart disease.  He has three children.

 

REVIEW OF SYSTEMS: All systems were reviewed and found be noncontributory except

as mentioned in the history of present illness.

 

PHYSICAL EXAMINATION:

VITAL SIGNS: Temperature 97.4, pulse 78, respiratory rate 18, 97% saturation on

room air.

HEART: Irregularly irregular.

LUNGS: Clear to auscultation.

ABDOMEN: Soft, nontender, nondistended.  No pedal edema.

MUSCULOSKELETAL: No musculoskeletal abnormalities.  No rash.  No signs of

meningeal irritation.

NEUROLOGY: The patient is awake, alert, oriented to self only.  He is  unable to

tell me name of city, state, country, hospital, day, date, month, year, name of

president.  He is unable to do serial sevens.  His recall is 0/3 at five minutes.

He cannot spell world backwards.  He has difficulty following one step commands.

He has difficulty with receptive and expressive parts of speech.  He makes

paraphasic errors when talking.

HEENT: Extraocular muscles are intact.  No facial weakness.  Uvula midline.  No

nystagmus.  Visual fields are full to confrontation.  5/5 strength in all upper

extremities.  Deep tendon flexes 1+  throughout.  He is gait is unsteady with

apraxia.

 

ASSESSMENT:

1.  Syncopal episode likely induced by bradycardia and heart block induced by

Aricept due to increased cholinergic stimulation and effects on sinoatrial (SA)

node and atrioventricular (AV) rachid conduction.

2. Advanced Alzheimer's dementia and gait apraxia.

3. Less than 50% bilateral carotid artery stenosis and the right vertebral artery

stenosis.

4. Moderate cerebral atrophy and small-vessel ischemic disease of brain.

5. Atrial fibrillation.

 

PLAN:

 

1. Discontinue Aricept due to bradycardia and heart block.

2. Continue Namenda 10 mg by mouth twice a day and quetiapine 50 mg by mouth

twice a day.

3. Continue aspirin 81 mg by mouth daily and Crestor 40 mg by mouth daily.

4. Keep blood pressure below 140/90.

5. Physical and occupational therapy.  He will benefit from placement to an

assisted living facility.

## 2019-10-04 VITALS — DIASTOLIC BLOOD PRESSURE: 69 MMHG | SYSTOLIC BLOOD PRESSURE: 121 MMHG

## 2019-10-04 LAB
B BURGDOR IGG+IGM SER-ACNC: <0.91 ISR (ref 0–0.9)
B BURGDOR IGM SER IA-ACNC: <0.8 INDEX (ref 0–0.79)

## 2019-10-04 RX ADMIN — LATANOPROST SCH DROP: 50 SOLUTION OPHTHALMIC at 21:00

## 2019-10-04 RX ADMIN — BRIMONIDINE TARTRATE SCH DROP: 1 SOLUTION/ DROPS OPHTHALMIC at 09:01

## 2019-10-04 RX ADMIN — FOLIC ACID SCH MG: 1 TABLET ORAL at 09:00

## 2019-10-04 RX ADMIN — DORZOLAMIDE HYDROCHLORIDE SCH DROP: 20 SOLUTION/ DROPS OPHTHALMIC at 09:01

## 2019-10-04 RX ADMIN — ASPIRIN SCH MG: 81 TABLET ORAL at 09:00

## 2019-10-04 RX ADMIN — MEMANTINE SCH MG: 5 TABLET ORAL at 09:00

## 2019-10-04 RX ADMIN — ROSUVASTATIN CALCIUM SCH MG: 10 TABLET, FILM COATED ORAL at 21:00

## 2019-10-04 RX ADMIN — BRIMONIDINE TARTRATE SCH DROP: 1 SOLUTION/ DROPS OPHTHALMIC at 21:00

## 2019-10-04 RX ADMIN — INSULIN LISPRO SCH UNITS: 100 INJECTION, SOLUTION INTRAVENOUS; SUBCUTANEOUS at 17:54

## 2019-10-04 RX ADMIN — OMEPRAZOLE SCH MG: 20 CAPSULE, DELAYED RELEASE ORAL at 09:00

## 2019-10-04 RX ADMIN — ENOXAPARIN SODIUM SCH MG: 40 INJECTION SUBCUTANEOUS at 08:59

## 2019-10-04 RX ADMIN — INSULIN LISPRO SCH UNITS: 100 INJECTION, SOLUTION INTRAVENOUS; SUBCUTANEOUS at 09:00

## 2019-10-04 RX ADMIN — DORZOLAMIDE HYDROCHLORIDE SCH DROP: 20 SOLUTION/ DROPS OPHTHALMIC at 21:00

## 2019-10-04 RX ADMIN — LISINOPRIL SCH MG: 5 TABLET ORAL at 09:00

## 2019-10-04 RX ADMIN — MEMANTINE SCH MG: 5 TABLET ORAL at 21:00

## 2019-10-04 RX ADMIN — INSULIN LISPRO SCH UNITS: 100 INJECTION, SOLUTION INTRAVENOUS; SUBCUTANEOUS at 12:04

## 2019-10-05 VITALS — SYSTOLIC BLOOD PRESSURE: 129 MMHG | DIASTOLIC BLOOD PRESSURE: 90 MMHG

## 2019-10-05 RX ADMIN — ROSUVASTATIN CALCIUM SCH MG: 10 TABLET, FILM COATED ORAL at 20:11

## 2019-10-05 RX ADMIN — INSULIN LISPRO SCH UNITS: 100 INJECTION, SOLUTION INTRAVENOUS; SUBCUTANEOUS at 12:07

## 2019-10-05 RX ADMIN — BRIMONIDINE TARTRATE SCH DROP: 1 SOLUTION/ DROPS OPHTHALMIC at 08:45

## 2019-10-05 RX ADMIN — DORZOLAMIDE HYDROCHLORIDE SCH DROP: 20 SOLUTION/ DROPS OPHTHALMIC at 20:12

## 2019-10-05 RX ADMIN — LISINOPRIL SCH MG: 5 TABLET ORAL at 08:44

## 2019-10-05 RX ADMIN — FOLIC ACID SCH MG: 1 TABLET ORAL at 08:44

## 2019-10-05 RX ADMIN — MEMANTINE SCH MG: 5 TABLET ORAL at 20:11

## 2019-10-05 RX ADMIN — BRIMONIDINE TARTRATE SCH DROP: 1 SOLUTION/ DROPS OPHTHALMIC at 20:11

## 2019-10-05 RX ADMIN — LATANOPROST SCH DROP: 50 SOLUTION OPHTHALMIC at 20:12

## 2019-10-05 RX ADMIN — INSULIN LISPRO SCH UNITS: 100 INJECTION, SOLUTION INTRAVENOUS; SUBCUTANEOUS at 08:43

## 2019-10-05 RX ADMIN — DORZOLAMIDE HYDROCHLORIDE SCH DROP: 20 SOLUTION/ DROPS OPHTHALMIC at 08:44

## 2019-10-05 RX ADMIN — ENOXAPARIN SODIUM SCH MG: 40 INJECTION SUBCUTANEOUS at 08:43

## 2019-10-05 RX ADMIN — OMEPRAZOLE SCH MG: 20 CAPSULE, DELAYED RELEASE ORAL at 08:43

## 2019-10-05 RX ADMIN — MEMANTINE SCH MG: 5 TABLET ORAL at 08:43

## 2019-10-05 RX ADMIN — INSULIN LISPRO SCH UNITS: 100 INJECTION, SOLUTION INTRAVENOUS; SUBCUTANEOUS at 17:04

## 2019-10-05 RX ADMIN — ASPIRIN SCH MG: 81 TABLET ORAL at 08:44

## 2019-10-06 VITALS — SYSTOLIC BLOOD PRESSURE: 128 MMHG | DIASTOLIC BLOOD PRESSURE: 63 MMHG

## 2019-10-06 RX ADMIN — METHOTREXATE SODIUM SCH MG: 2.5 TABLET ORAL at 09:14

## 2019-10-06 RX ADMIN — ROSUVASTATIN CALCIUM SCH MG: 10 TABLET, FILM COATED ORAL at 20:10

## 2019-10-06 RX ADMIN — MEMANTINE SCH MG: 5 TABLET ORAL at 20:10

## 2019-10-06 RX ADMIN — MEMANTINE SCH MG: 5 TABLET ORAL at 09:14

## 2019-10-06 RX ADMIN — INSULIN LISPRO SCH UNITS: 100 INJECTION, SOLUTION INTRAVENOUS; SUBCUTANEOUS at 13:12

## 2019-10-06 RX ADMIN — ENOXAPARIN SODIUM SCH MG: 40 INJECTION SUBCUTANEOUS at 09:12

## 2019-10-06 RX ADMIN — INSULIN LISPRO SCH UNITS: 100 INJECTION, SOLUTION INTRAVENOUS; SUBCUTANEOUS at 17:35

## 2019-10-06 RX ADMIN — FOLIC ACID SCH MG: 1 TABLET ORAL at 09:13

## 2019-10-06 RX ADMIN — BRIMONIDINE TARTRATE SCH DROP: 1 SOLUTION/ DROPS OPHTHALMIC at 09:11

## 2019-10-06 RX ADMIN — OMEPRAZOLE SCH MG: 20 CAPSULE, DELAYED RELEASE ORAL at 09:13

## 2019-10-06 RX ADMIN — LATANOPROST SCH DROP: 50 SOLUTION OPHTHALMIC at 20:10

## 2019-10-06 RX ADMIN — LISINOPRIL SCH MG: 5 TABLET ORAL at 09:16

## 2019-10-06 RX ADMIN — DORZOLAMIDE HYDROCHLORIDE SCH DROP: 20 SOLUTION/ DROPS OPHTHALMIC at 20:10

## 2019-10-06 RX ADMIN — DORZOLAMIDE HYDROCHLORIDE SCH DROP: 20 SOLUTION/ DROPS OPHTHALMIC at 09:22

## 2019-10-06 RX ADMIN — ASPIRIN SCH MG: 81 TABLET ORAL at 09:13

## 2019-10-06 RX ADMIN — BRIMONIDINE TARTRATE SCH DROP: 1 SOLUTION/ DROPS OPHTHALMIC at 20:10

## 2019-10-06 RX ADMIN — INSULIN LISPRO SCH UNITS: 100 INJECTION, SOLUTION INTRAVENOUS; SUBCUTANEOUS at 09:12

## 2019-10-07 VITALS — DIASTOLIC BLOOD PRESSURE: 83 MMHG | SYSTOLIC BLOOD PRESSURE: 141 MMHG

## 2019-10-07 LAB
BUN SERPL-MCNC: 21 MG/DL (ref 7–18)
CALCIUM SERPL-MCNC: 8.8 MG/DL (ref 8.8–10.2)
CHLORIDE SERPL-SCNC: 105 MEQ/L (ref 98–107)
CO2 SERPL-SCNC: 30 MEQ/L (ref 21–32)
CREAT SERPL-MCNC: 1.15 MG/DL (ref 0.7–1.3)
GFR SERPL CREATININE-BSD FRML MDRD: > 60 ML/MIN/{1.73_M2} (ref 35–?)
GLUCOSE SERPL-MCNC: 155 MG/DL (ref 70–100)
HCT VFR BLD AUTO: 39 % (ref 42–52)
HGB BLD-MCNC: 12.9 G/DL (ref 13.5–17.5)
MCH RBC QN AUTO: 31.5 PG (ref 27–33)
MCHC RBC AUTO-ENTMCNC: 33.1 G/DL (ref 32–36.5)
MCV RBC AUTO: 95.1 FL (ref 80–96)
PLATELET # BLD AUTO: 148 10^3/UL (ref 150–450)
POTASSIUM SERPL-SCNC: 4.5 MEQ/L (ref 3.5–5.1)
RBC # BLD AUTO: 4.1 10^6/UL (ref 4.3–6.1)
SODIUM SERPL-SCNC: 141 MEQ/L (ref 136–145)
WBC # BLD AUTO: 6.6 10^3/UL (ref 4–10)

## 2019-10-07 RX ADMIN — INSULIN LISPRO SCH UNITS: 100 INJECTION, SOLUTION INTRAVENOUS; SUBCUTANEOUS at 10:03

## 2019-10-07 RX ADMIN — LISINOPRIL SCH MG: 5 TABLET ORAL at 10:05

## 2019-10-07 RX ADMIN — BRIMONIDINE TARTRATE SCH DROP: 1 SOLUTION/ DROPS OPHTHALMIC at 22:02

## 2019-10-07 RX ADMIN — BRIMONIDINE TARTRATE SCH DROP: 1 SOLUTION/ DROPS OPHTHALMIC at 10:07

## 2019-10-07 RX ADMIN — INSULIN LISPRO SCH UNITS: 100 INJECTION, SOLUTION INTRAVENOUS; SUBCUTANEOUS at 17:54

## 2019-10-07 RX ADMIN — FOLIC ACID SCH MG: 1 TABLET ORAL at 10:04

## 2019-10-07 RX ADMIN — ASPIRIN SCH MG: 81 TABLET ORAL at 10:05

## 2019-10-07 RX ADMIN — OMEPRAZOLE SCH MG: 20 CAPSULE, DELAYED RELEASE ORAL at 10:04

## 2019-10-07 RX ADMIN — DORZOLAMIDE HYDROCHLORIDE SCH DROP: 20 SOLUTION/ DROPS OPHTHALMIC at 10:07

## 2019-10-07 RX ADMIN — INSULIN LISPRO SCH UNITS: 100 INJECTION, SOLUTION INTRAVENOUS; SUBCUTANEOUS at 12:46

## 2019-10-07 RX ADMIN — ROSUVASTATIN CALCIUM SCH MG: 10 TABLET, FILM COATED ORAL at 20:24

## 2019-10-07 RX ADMIN — LATANOPROST SCH DROP: 50 SOLUTION OPHTHALMIC at 22:02

## 2019-10-07 RX ADMIN — MEMANTINE SCH MG: 5 TABLET ORAL at 20:25

## 2019-10-07 RX ADMIN — DORZOLAMIDE HYDROCHLORIDE SCH DROP: 20 SOLUTION/ DROPS OPHTHALMIC at 22:02

## 2019-10-07 RX ADMIN — ENOXAPARIN SODIUM SCH MG: 40 INJECTION SUBCUTANEOUS at 10:03

## 2019-10-07 RX ADMIN — MEMANTINE SCH MG: 5 TABLET ORAL at 10:04

## 2019-10-08 VITALS — SYSTOLIC BLOOD PRESSURE: 108 MMHG | DIASTOLIC BLOOD PRESSURE: 64 MMHG

## 2019-10-08 RX ADMIN — LISINOPRIL SCH MG: 5 TABLET ORAL at 08:22

## 2019-10-08 RX ADMIN — BRIMONIDINE TARTRATE SCH DROP: 1 SOLUTION/ DROPS OPHTHALMIC at 22:09

## 2019-10-08 RX ADMIN — INSULIN LISPRO SCH UNITS: 100 INJECTION, SOLUTION INTRAVENOUS; SUBCUTANEOUS at 12:28

## 2019-10-08 RX ADMIN — DORZOLAMIDE HYDROCHLORIDE SCH DROP: 20 SOLUTION/ DROPS OPHTHALMIC at 08:23

## 2019-10-08 RX ADMIN — LATANOPROST SCH DROP: 50 SOLUTION OPHTHALMIC at 22:08

## 2019-10-08 RX ADMIN — ENOXAPARIN SODIUM SCH MG: 40 INJECTION SUBCUTANEOUS at 08:23

## 2019-10-08 RX ADMIN — MEMANTINE SCH MG: 5 TABLET ORAL at 22:07

## 2019-10-08 RX ADMIN — FOLIC ACID SCH MG: 1 TABLET ORAL at 08:22

## 2019-10-08 RX ADMIN — DORZOLAMIDE HYDROCHLORIDE SCH DROP: 20 SOLUTION/ DROPS OPHTHALMIC at 22:08

## 2019-10-08 RX ADMIN — ASPIRIN SCH MG: 81 TABLET ORAL at 08:22

## 2019-10-08 RX ADMIN — OMEPRAZOLE SCH MG: 20 CAPSULE, DELAYED RELEASE ORAL at 08:20

## 2019-10-08 RX ADMIN — INSULIN LISPRO SCH UNITS: 100 INJECTION, SOLUTION INTRAVENOUS; SUBCUTANEOUS at 08:20

## 2019-10-08 RX ADMIN — BRIMONIDINE TARTRATE SCH DROP: 1 SOLUTION/ DROPS OPHTHALMIC at 08:23

## 2019-10-08 RX ADMIN — ROSUVASTATIN CALCIUM SCH MG: 10 TABLET, FILM COATED ORAL at 22:08

## 2019-10-08 RX ADMIN — INSULIN LISPRO SCH UNITS: 100 INJECTION, SOLUTION INTRAVENOUS; SUBCUTANEOUS at 18:20

## 2019-10-08 RX ADMIN — MEMANTINE SCH MG: 5 TABLET ORAL at 08:21

## 2019-10-08 NOTE — IPN
DATE OF SERVICE:  10/08/2019

 

SUBJECTIVE:  The patient denies any shortness of breath, chest pain, pressure,

tightness, lightheadedness.  No nausea, vomiting, diarrhea, abdominal pain.

Tolerating his diet well.  The patient's pain at the left metacarpophalangeal

(MCP) joint of the index finger has resolved, status post prednisone.  Appetite

is good.  He is cooperative with physical therapy.  He is still forgetful and

disoriented.

 

PHYSICAL EXAMINATION:

Temperature 97.9, pulse 79, respiratory rate 18, blood pressure 108/64, 98% on

room air.

GENERALLY:  The patient is awake, alert, oriented to himself only.  He is

cooperative.  Answering questions appropriately.

Speech is fluent.  Face is symmetric.

No jugular venous distention (JVD), thyromegaly.  Moist mucous membranes.

LUNGS:  Are clear to auscultation.  No wheezing, rales, or rhonchi.

HEART:  S1, S2.  Irregularly irregular.

ABDOMEN:  Obese, soft, nontender, nondistended.  Positive bowel sounds times 
four

quadrants.  No abdominal bruit.  No hepatosplenomegaly.  No rebound, guarding.

 

EXTREMITIES:  No cyanosis, clubbing, or pitting edema.

 

LABORATORY DATA:

10/07/2019 white count is 6.6, hemoglobin 12, hematocrit 39, platelet count 148.

 

Sodium 141, potassium 4.5, chloride 105, bicarbonate 30, BUN 21, creatinine 
1.15,

glucose of 155, TSH of 2.1.

 

ASSESSMENT AND PLAN:  This is an 84-year-old male, previously lives at home with

his wife, now with advancing dementia, presented with a vasovagal with syncope,

orthostatic hypertension though to be secondary to timolol and Aricept.

Electrocardiogram (EKG) noted second sinus pause.  Currently awaiting placement

at [facility], which is planned for 10/15/2019.

 

IMPRESSION:

 

1.  Syncope secondary to adverse effect from timolol and Aricept, both of which

have been discontinued and were given at the correct dose and frequency.  On

telemetry, the patient had a sinus pause of 2.2 seconds, which resolved after

discontinuation of the Aricept, which Dr. Hernandez, as well as Dr. Escobar,

cardiologist and neurologist, respectively, both agreed with.  The patient's

orthostasis has resolved after discontinuation of the timolol, which the

ophthalmologist in [place] agreed with.

 

2.  Orthostatic hypotension, resolved.  Currently tolerating his diet well.

 

3.  Gait imbalance.  Per neurology, no further workup.  CT angiography shows

heavy calcification in left vertebral body with no cerebellar strokes.  
Currently

awaiting placement to [facility].

 

4.  Chronic atrial fibrillation (AFib), rate controlled.  Currently on no

medications.  He is only on aspirin due to increased risk of intracranial

hemorrhage with recurrent falls.

 

5.  Rheumatoid arthritis with swelling of the left index finger, MCP joint,

resolved with a short course of prednisone.  He is continued on his home dose of

methotrexate weekly and Simponi.

 

6.  Glaucoma.  Timolol was discontinued due to hypotension and syncopal episode.

This was approved by his ophthalmologist in [place], the patient's wife, and

other eyedrops may be continued.

 

DISPOSITION:  He continues to be on alternate level of care (ALC)/skilled 
nursing

facility (SNF) status, awaiting placement to [facility] on 10/15/2019.

LOUIS

## 2019-10-09 VITALS — SYSTOLIC BLOOD PRESSURE: 140 MMHG | DIASTOLIC BLOOD PRESSURE: 80 MMHG

## 2019-10-09 RX ADMIN — FOLIC ACID SCH MG: 1 TABLET ORAL at 07:50

## 2019-10-09 RX ADMIN — DORZOLAMIDE HYDROCHLORIDE SCH DROP: 20 SOLUTION/ DROPS OPHTHALMIC at 22:10

## 2019-10-09 RX ADMIN — BRIMONIDINE TARTRATE SCH DROP: 1 SOLUTION/ DROPS OPHTHALMIC at 22:10

## 2019-10-09 RX ADMIN — OMEPRAZOLE SCH MG: 20 CAPSULE, DELAYED RELEASE ORAL at 07:49

## 2019-10-09 RX ADMIN — MEMANTINE SCH MG: 5 TABLET ORAL at 07:49

## 2019-10-09 RX ADMIN — INSULIN LISPRO SCH UNITS: 100 INJECTION, SOLUTION INTRAVENOUS; SUBCUTANEOUS at 07:30

## 2019-10-09 RX ADMIN — INSULIN LISPRO SCH UNITS: 100 INJECTION, SOLUTION INTRAVENOUS; SUBCUTANEOUS at 17:28

## 2019-10-09 RX ADMIN — DORZOLAMIDE HYDROCHLORIDE SCH DROP: 20 SOLUTION/ DROPS OPHTHALMIC at 07:55

## 2019-10-09 RX ADMIN — ROSUVASTATIN CALCIUM SCH MG: 10 TABLET, FILM COATED ORAL at 22:09

## 2019-10-09 RX ADMIN — BRIMONIDINE TARTRATE SCH DROP: 1 SOLUTION/ DROPS OPHTHALMIC at 07:55

## 2019-10-09 RX ADMIN — INSULIN LISPRO SCH UNITS: 100 INJECTION, SOLUTION INTRAVENOUS; SUBCUTANEOUS at 12:02

## 2019-10-09 RX ADMIN — ASPIRIN SCH MG: 81 TABLET ORAL at 07:50

## 2019-10-09 RX ADMIN — LISINOPRIL SCH MG: 5 TABLET ORAL at 07:50

## 2019-10-09 RX ADMIN — LATANOPROST SCH DROP: 50 SOLUTION OPHTHALMIC at 22:09

## 2019-10-09 RX ADMIN — ENOXAPARIN SODIUM SCH MG: 40 INJECTION SUBCUTANEOUS at 09:00

## 2019-10-09 RX ADMIN — MEMANTINE SCH MG: 5 TABLET ORAL at 22:09

## 2019-10-10 VITALS — DIASTOLIC BLOOD PRESSURE: 66 MMHG | SYSTOLIC BLOOD PRESSURE: 108 MMHG

## 2019-10-10 LAB
HCT VFR BLD AUTO: 37.7 % (ref 42–52)
HGB BLD-MCNC: 12.5 G/DL (ref 13.5–17.5)
MCH RBC QN AUTO: 31.3 PG (ref 27–33)
MCHC RBC AUTO-ENTMCNC: 33.2 G/DL (ref 32–36.5)
MCV RBC AUTO: 94.3 FL (ref 80–96)
PLATELET # BLD AUTO: 146 10^3/UL (ref 150–450)
RBC # BLD AUTO: 4 10^6/UL (ref 4.3–6.1)
WBC # BLD AUTO: 9.1 10^3/UL (ref 4–10)

## 2019-10-10 RX ADMIN — BRIMONIDINE TARTRATE SCH DROP: 1 SOLUTION/ DROPS OPHTHALMIC at 21:00

## 2019-10-10 RX ADMIN — OMEPRAZOLE SCH MG: 20 CAPSULE, DELAYED RELEASE ORAL at 07:51

## 2019-10-10 RX ADMIN — BRIMONIDINE TARTRATE SCH DROP: 1 SOLUTION/ DROPS OPHTHALMIC at 07:52

## 2019-10-10 RX ADMIN — FOLIC ACID SCH MG: 1 TABLET ORAL at 07:52

## 2019-10-10 RX ADMIN — INSULIN LISPRO SCH UNITS: 100 INJECTION, SOLUTION INTRAVENOUS; SUBCUTANEOUS at 17:47

## 2019-10-10 RX ADMIN — MEMANTINE SCH MG: 5 TABLET ORAL at 07:51

## 2019-10-10 RX ADMIN — INSULIN LISPRO SCH UNITS: 100 INJECTION, SOLUTION INTRAVENOUS; SUBCUTANEOUS at 12:33

## 2019-10-10 RX ADMIN — INSULIN LISPRO SCH UNITS: 100 INJECTION, SOLUTION INTRAVENOUS; SUBCUTANEOUS at 07:30

## 2019-10-10 RX ADMIN — DORZOLAMIDE HYDROCHLORIDE SCH DROP: 20 SOLUTION/ DROPS OPHTHALMIC at 21:00

## 2019-10-10 RX ADMIN — MEMANTINE SCH MG: 5 TABLET ORAL at 21:00

## 2019-10-10 RX ADMIN — ROSUVASTATIN CALCIUM SCH MG: 10 TABLET, FILM COATED ORAL at 21:00

## 2019-10-10 RX ADMIN — ASPIRIN SCH MG: 81 TABLET ORAL at 07:52

## 2019-10-10 RX ADMIN — LATANOPROST SCH DROP: 50 SOLUTION OPHTHALMIC at 21:00

## 2019-10-10 RX ADMIN — ENOXAPARIN SODIUM SCH MG: 40 INJECTION SUBCUTANEOUS at 07:58

## 2019-10-10 RX ADMIN — DORZOLAMIDE HYDROCHLORIDE SCH DROP: 20 SOLUTION/ DROPS OPHTHALMIC at 07:52

## 2019-10-10 RX ADMIN — LISINOPRIL SCH MG: 5 TABLET ORAL at 07:52

## 2019-10-11 VITALS — SYSTOLIC BLOOD PRESSURE: 128 MMHG | DIASTOLIC BLOOD PRESSURE: 74 MMHG

## 2019-10-11 RX ADMIN — LISINOPRIL SCH MG: 5 TABLET ORAL at 08:53

## 2019-10-11 RX ADMIN — INSULIN LISPRO SCH UNITS: 100 INJECTION, SOLUTION INTRAVENOUS; SUBCUTANEOUS at 17:25

## 2019-10-11 RX ADMIN — INSULIN LISPRO SCH UNITS: 100 INJECTION, SOLUTION INTRAVENOUS; SUBCUTANEOUS at 12:36

## 2019-10-11 RX ADMIN — FOLIC ACID SCH MG: 1 TABLET ORAL at 08:54

## 2019-10-11 RX ADMIN — LATANOPROST SCH DROP: 50 SOLUTION OPHTHALMIC at 20:23

## 2019-10-11 RX ADMIN — MEMANTINE SCH MG: 5 TABLET ORAL at 20:23

## 2019-10-11 RX ADMIN — BRIMONIDINE TARTRATE SCH DROP: 1 SOLUTION/ DROPS OPHTHALMIC at 08:55

## 2019-10-11 RX ADMIN — ASPIRIN SCH MG: 81 TABLET ORAL at 08:53

## 2019-10-11 RX ADMIN — BRIMONIDINE TARTRATE SCH DROP: 1 SOLUTION/ DROPS OPHTHALMIC at 20:23

## 2019-10-11 RX ADMIN — MEMANTINE SCH MG: 5 TABLET ORAL at 08:53

## 2019-10-11 RX ADMIN — ENOXAPARIN SODIUM SCH MG: 40 INJECTION SUBCUTANEOUS at 08:54

## 2019-10-11 RX ADMIN — DORZOLAMIDE HYDROCHLORIDE SCH DROP: 20 SOLUTION/ DROPS OPHTHALMIC at 20:23

## 2019-10-11 RX ADMIN — INSULIN LISPRO SCH UNITS: 100 INJECTION, SOLUTION INTRAVENOUS; SUBCUTANEOUS at 07:30

## 2019-10-11 RX ADMIN — DORZOLAMIDE HYDROCHLORIDE SCH DROP: 20 SOLUTION/ DROPS OPHTHALMIC at 08:55

## 2019-10-11 RX ADMIN — OMEPRAZOLE SCH MG: 20 CAPSULE, DELAYED RELEASE ORAL at 08:54

## 2019-10-11 RX ADMIN — ROSUVASTATIN CALCIUM SCH MG: 10 TABLET, FILM COATED ORAL at 20:23

## 2019-10-12 VITALS — SYSTOLIC BLOOD PRESSURE: 149 MMHG | DIASTOLIC BLOOD PRESSURE: 88 MMHG

## 2019-10-12 VITALS — SYSTOLIC BLOOD PRESSURE: 135 MMHG | DIASTOLIC BLOOD PRESSURE: 65 MMHG

## 2019-10-12 RX ADMIN — INSULIN LISPRO SCH UNITS: 100 INJECTION, SOLUTION INTRAVENOUS; SUBCUTANEOUS at 17:30

## 2019-10-12 RX ADMIN — MEMANTINE SCH MG: 5 TABLET ORAL at 20:22

## 2019-10-12 RX ADMIN — ENOXAPARIN SODIUM SCH MG: 40 INJECTION SUBCUTANEOUS at 09:37

## 2019-10-12 RX ADMIN — FOLIC ACID SCH MG: 1 TABLET ORAL at 09:40

## 2019-10-12 RX ADMIN — ASPIRIN SCH MG: 81 TABLET ORAL at 09:37

## 2019-10-12 RX ADMIN — BRIMONIDINE TARTRATE SCH DROP: 1 SOLUTION/ DROPS OPHTHALMIC at 20:23

## 2019-10-12 RX ADMIN — LATANOPROST SCH DROP: 50 SOLUTION OPHTHALMIC at 20:23

## 2019-10-12 RX ADMIN — DORZOLAMIDE HYDROCHLORIDE SCH DROP: 20 SOLUTION/ DROPS OPHTHALMIC at 09:41

## 2019-10-12 RX ADMIN — OMEPRAZOLE SCH MG: 20 CAPSULE, DELAYED RELEASE ORAL at 09:37

## 2019-10-12 RX ADMIN — DORZOLAMIDE HYDROCHLORIDE SCH DROP: 20 SOLUTION/ DROPS OPHTHALMIC at 20:23

## 2019-10-12 RX ADMIN — INSULIN LISPRO SCH UNITS: 100 INJECTION, SOLUTION INTRAVENOUS; SUBCUTANEOUS at 12:52

## 2019-10-12 RX ADMIN — MEMANTINE SCH MG: 5 TABLET ORAL at 09:37

## 2019-10-12 RX ADMIN — LISINOPRIL SCH MG: 5 TABLET ORAL at 09:40

## 2019-10-12 RX ADMIN — BRIMONIDINE TARTRATE SCH DROP: 1 SOLUTION/ DROPS OPHTHALMIC at 09:41

## 2019-10-12 RX ADMIN — ROSUVASTATIN CALCIUM SCH MG: 10 TABLET, FILM COATED ORAL at 20:22

## 2019-10-12 RX ADMIN — INSULIN LISPRO SCH UNITS: 100 INJECTION, SOLUTION INTRAVENOUS; SUBCUTANEOUS at 07:30

## 2019-10-13 VITALS — SYSTOLIC BLOOD PRESSURE: 120 MMHG | DIASTOLIC BLOOD PRESSURE: 68 MMHG

## 2019-10-13 VITALS — SYSTOLIC BLOOD PRESSURE: 127 MMHG | DIASTOLIC BLOOD PRESSURE: 78 MMHG

## 2019-10-13 RX ADMIN — METHOTREXATE SODIUM SCH MG: 2.5 TABLET ORAL at 11:11

## 2019-10-13 RX ADMIN — ROSUVASTATIN CALCIUM SCH MG: 10 TABLET, FILM COATED ORAL at 20:58

## 2019-10-13 RX ADMIN — MEMANTINE SCH MG: 5 TABLET ORAL at 09:04

## 2019-10-13 RX ADMIN — DORZOLAMIDE HYDROCHLORIDE SCH DROP: 20 SOLUTION/ DROPS OPHTHALMIC at 09:13

## 2019-10-13 RX ADMIN — MEMANTINE SCH MG: 5 TABLET ORAL at 20:58

## 2019-10-13 RX ADMIN — ENOXAPARIN SODIUM SCH MG: 40 INJECTION SUBCUTANEOUS at 09:06

## 2019-10-13 RX ADMIN — INSULIN LISPRO SCH UNITS: 100 INJECTION, SOLUTION INTRAVENOUS; SUBCUTANEOUS at 17:30

## 2019-10-13 RX ADMIN — INSULIN LISPRO SCH UNITS: 100 INJECTION, SOLUTION INTRAVENOUS; SUBCUTANEOUS at 14:04

## 2019-10-13 RX ADMIN — INSULIN LISPRO SCH UNITS: 100 INJECTION, SOLUTION INTRAVENOUS; SUBCUTANEOUS at 07:30

## 2019-10-13 RX ADMIN — FOLIC ACID SCH MG: 1 TABLET ORAL at 09:04

## 2019-10-13 RX ADMIN — LATANOPROST SCH DROP: 50 SOLUTION OPHTHALMIC at 20:58

## 2019-10-13 RX ADMIN — LISINOPRIL SCH MG: 5 TABLET ORAL at 09:06

## 2019-10-13 RX ADMIN — DORZOLAMIDE HYDROCHLORIDE SCH DROP: 20 SOLUTION/ DROPS OPHTHALMIC at 20:59

## 2019-10-13 RX ADMIN — BRIMONIDINE TARTRATE SCH DROP: 1 SOLUTION/ DROPS OPHTHALMIC at 09:13

## 2019-10-13 RX ADMIN — BRIMONIDINE TARTRATE SCH DROP: 1 SOLUTION/ DROPS OPHTHALMIC at 20:59

## 2019-10-13 RX ADMIN — ASPIRIN SCH MG: 81 TABLET ORAL at 09:04

## 2019-10-13 RX ADMIN — OMEPRAZOLE SCH MG: 20 CAPSULE, DELAYED RELEASE ORAL at 09:04

## 2019-10-14 VITALS — SYSTOLIC BLOOD PRESSURE: 123 MMHG | DIASTOLIC BLOOD PRESSURE: 80 MMHG

## 2019-10-14 LAB
BUN SERPL-MCNC: 28 MG/DL (ref 7–18)
CALCIUM SERPL-MCNC: 8.9 MG/DL (ref 8.8–10.2)
CHLORIDE SERPL-SCNC: 104 MEQ/L (ref 98–107)
CO2 SERPL-SCNC: 33 MEQ/L (ref 21–32)
CREAT SERPL-MCNC: 1.27 MG/DL (ref 0.7–1.3)
GFR SERPL CREATININE-BSD FRML MDRD: 57.7 ML/MIN/{1.73_M2} (ref 35–?)
GLUCOSE SERPL-MCNC: 125 MG/DL (ref 70–100)
HCT VFR BLD AUTO: 38.4 % (ref 42–52)
HGB BLD-MCNC: 12.5 G/DL (ref 13.5–17.5)
MCH RBC QN AUTO: 31.6 PG (ref 27–33)
MCHC RBC AUTO-ENTMCNC: 32.6 G/DL (ref 32–36.5)
MCV RBC AUTO: 97.2 FL (ref 80–96)
PLATELET # BLD AUTO: 119 10^3/UL (ref 150–450)
POTASSIUM SERPL-SCNC: 4 MEQ/L (ref 3.5–5.1)
RBC # BLD AUTO: 3.95 10^6/UL (ref 4.3–6.1)
SODIUM SERPL-SCNC: 139 MEQ/L (ref 136–145)
WBC # BLD AUTO: 8.6 10^3/UL (ref 4–10)

## 2019-10-14 RX ADMIN — MEMANTINE SCH MG: 5 TABLET ORAL at 09:52

## 2019-10-14 RX ADMIN — DORZOLAMIDE HYDROCHLORIDE SCH DROP: 20 SOLUTION/ DROPS OPHTHALMIC at 20:45

## 2019-10-14 RX ADMIN — LATANOPROST SCH DROP: 50 SOLUTION OPHTHALMIC at 20:45

## 2019-10-14 RX ADMIN — BRIMONIDINE TARTRATE SCH DROP: 1 SOLUTION/ DROPS OPHTHALMIC at 09:51

## 2019-10-14 RX ADMIN — ROSUVASTATIN CALCIUM SCH MG: 10 TABLET, FILM COATED ORAL at 20:46

## 2019-10-14 RX ADMIN — INSULIN LISPRO SCH UNITS: 100 INJECTION, SOLUTION INTRAVENOUS; SUBCUTANEOUS at 07:30

## 2019-10-14 RX ADMIN — ASPIRIN SCH MG: 81 TABLET ORAL at 09:52

## 2019-10-14 RX ADMIN — OMEPRAZOLE SCH MG: 20 CAPSULE, DELAYED RELEASE ORAL at 09:52

## 2019-10-14 RX ADMIN — MEMANTINE SCH MG: 5 TABLET ORAL at 20:45

## 2019-10-14 RX ADMIN — LISINOPRIL SCH MG: 5 TABLET ORAL at 09:52

## 2019-10-14 RX ADMIN — FOLIC ACID SCH MG: 1 TABLET ORAL at 09:53

## 2019-10-14 RX ADMIN — ENOXAPARIN SODIUM SCH MG: 40 INJECTION SUBCUTANEOUS at 09:51

## 2019-10-14 RX ADMIN — INSULIN LISPRO SCH UNITS: 100 INJECTION, SOLUTION INTRAVENOUS; SUBCUTANEOUS at 18:20

## 2019-10-14 RX ADMIN — INSULIN LISPRO SCH UNITS: 100 INJECTION, SOLUTION INTRAVENOUS; SUBCUTANEOUS at 12:40

## 2019-10-14 RX ADMIN — BRIMONIDINE TARTRATE SCH DROP: 1 SOLUTION/ DROPS OPHTHALMIC at 20:45

## 2019-10-14 RX ADMIN — DORZOLAMIDE HYDROCHLORIDE SCH DROP: 20 SOLUTION/ DROPS OPHTHALMIC at 09:51

## 2019-10-15 VITALS — DIASTOLIC BLOOD PRESSURE: 84 MMHG | SYSTOLIC BLOOD PRESSURE: 129 MMHG

## 2019-10-15 RX ADMIN — OMEPRAZOLE SCH MG: 20 CAPSULE, DELAYED RELEASE ORAL at 09:04

## 2019-10-15 RX ADMIN — ENOXAPARIN SODIUM SCH MG: 40 INJECTION SUBCUTANEOUS at 09:03

## 2019-10-15 RX ADMIN — INSULIN LISPRO SCH UNITS: 100 INJECTION, SOLUTION INTRAVENOUS; SUBCUTANEOUS at 12:18

## 2019-10-15 RX ADMIN — MEMANTINE SCH MG: 5 TABLET ORAL at 09:03

## 2019-10-15 RX ADMIN — LATANOPROST SCH DROP: 50 SOLUTION OPHTHALMIC at 20:01

## 2019-10-15 RX ADMIN — ASPIRIN SCH MG: 81 TABLET ORAL at 09:04

## 2019-10-15 RX ADMIN — BRIMONIDINE TARTRATE SCH DROP: 1 SOLUTION/ DROPS OPHTHALMIC at 20:00

## 2019-10-15 RX ADMIN — MEMANTINE SCH MG: 5 TABLET ORAL at 20:00

## 2019-10-15 RX ADMIN — DORZOLAMIDE HYDROCHLORIDE SCH DROP: 20 SOLUTION/ DROPS OPHTHALMIC at 20:01

## 2019-10-15 RX ADMIN — BRIMONIDINE TARTRATE SCH DROP: 1 SOLUTION/ DROPS OPHTHALMIC at 10:34

## 2019-10-15 RX ADMIN — FOLIC ACID SCH MG: 1 TABLET ORAL at 09:04

## 2019-10-15 RX ADMIN — LISINOPRIL SCH MG: 5 TABLET ORAL at 09:04

## 2019-10-15 RX ADMIN — ROSUVASTATIN CALCIUM SCH MG: 10 TABLET, FILM COATED ORAL at 20:00

## 2019-10-15 RX ADMIN — INSULIN LISPRO SCH UNITS: 100 INJECTION, SOLUTION INTRAVENOUS; SUBCUTANEOUS at 17:50

## 2019-10-15 RX ADMIN — INSULIN LISPRO SCH UNITS: 100 INJECTION, SOLUTION INTRAVENOUS; SUBCUTANEOUS at 09:03

## 2019-10-15 RX ADMIN — DORZOLAMIDE HYDROCHLORIDE SCH DROP: 20 SOLUTION/ DROPS OPHTHALMIC at 10:34

## 2019-10-15 NOTE — IPNPDOC
Date Seen


The patient was seen on 10/15/19.





Progress Note


SUBJECTIVE: 


Patient has no complaints. 


Feeling well without any dizziness. 


No further reports of orthostatic hypotension. 





OBJECTIVE


PHYSICAL EXAMINATION:


VITAL SIGNS: Please see below. 


General: No acute distress,  Alert


Eyes: Normal sclera, EOMI


HENT: Atraumatic


Cardiovascular: Normal rate, normal rhythm. 


Pulmonary: Clear to auscultation b/l, no wheezing


GI: Soft, nontender, nondistended


Skin: Warm and dry


Neuro: CN grossly intact. No focal deficits. Strengths equal b/l. 


Psych: oriented x 3





LABORATORY DATA, IMAGING STUDIES, MICROBIOLOGY: Please see below.





DVT prophylaxis ordered?: Lovenox





ASSESSMENT AND PLAN: 


1. Syncope/Orthostatic hypotension


- Resolved.


- Suspected to be 2/2 Aricept and Timolol, had been discontinued. 


- Plan agreed with neurology and cardiology. 


2. gait imbalance. 


- CT angio showed calcification in L. vertebral body. 


- PT.


3. Afib


- rate controlled. not on AC. 


- On Aspirin only due to increased risk of intracranial hemorrhage and recurrent

falls. 


4. Rheumatoid arthritis


- c/w home dose methotrexate and Simponi. 





Dispo: Pending placement to [facility] home. Likely d/c tomorrow. 








DISPOSITION: .





VS, I&O, 24H, [facility]


Vital Signs/I&O





Vital Signs








  Date Time  Temp Pulse Resp B/P (MAP) Pulse Ox O2 Delivery O2 Flow Rate FiO2


 


10/15/19 09:04    118/64    


 


10/15/19 06:00 97.1 84 18  97   














I&O- Last 24 Hours up to 6 AM 


 


 10/15/19





 05:59


 


Intake Total 1030 ml


 


Balance 1030 ml











Laboratory Data


24H LABS


Laboratory Tests 2


10/15/19 06:03: Bedside Glucose (Misc Panel) 112H


10/15/19 11:16: Bedside Glucose (Misc Panel) 163H


10/15/19 16:50: Bedside Glucose (Misc Panel) 123H


10/15/19 19:18: Bedside Glucose (Misc Panel) 134H











TOAN GARCES MD                Oct 15, 2019 20:10

## 2019-10-16 VITALS — DIASTOLIC BLOOD PRESSURE: 85 MMHG | SYSTOLIC BLOOD PRESSURE: 124 MMHG

## 2019-10-16 RX ADMIN — INSULIN LISPRO SCH UNITS: 100 INJECTION, SOLUTION INTRAVENOUS; SUBCUTANEOUS at 09:16

## 2019-10-16 RX ADMIN — MEMANTINE SCH MG: 5 TABLET ORAL at 09:17

## 2019-10-16 RX ADMIN — DORZOLAMIDE HYDROCHLORIDE SCH DROP: 20 SOLUTION/ DROPS OPHTHALMIC at 09:20

## 2019-10-16 RX ADMIN — LISINOPRIL SCH MG: 5 TABLET ORAL at 09:17

## 2019-10-16 RX ADMIN — OMEPRAZOLE SCH MG: 20 CAPSULE, DELAYED RELEASE ORAL at 09:17

## 2019-10-16 RX ADMIN — FOLIC ACID SCH MG: 1 TABLET ORAL at 09:17

## 2019-10-16 RX ADMIN — ASPIRIN SCH MG: 81 TABLET ORAL at 09:17

## 2019-10-16 RX ADMIN — ENOXAPARIN SODIUM SCH MG: 40 INJECTION SUBCUTANEOUS at 09:17

## 2019-10-16 RX ADMIN — BRIMONIDINE TARTRATE SCH DROP: 1 SOLUTION/ DROPS OPHTHALMIC at 09:19

## 2019-10-16 NOTE — DS.PDOC
Discharge Summary


General


Date of Admission


Sep 30, 2019 at 18:30


Date of Discharge


10/16/19





Discharge Summary


PROCEDURES PERFORMED DURING STAY: 





ADMITTING DIAGNOSES: 


1. Syncope


2. Orthostatic hypotension


3. Afib


4. Dementia


5. HLD


6. HTN


7. JOSE


8. NIDDM


9. Rheumatoid arthritis


10. Glaucoma


11. Hx traumatic brain injury


12. OA


13. GERD





DISCHARGE DIAGNOSES:


1. Syncope


2. Orthostatic hypotension


3. Afib


4. Dementia


5. HLD


6. HTN


7. JOSE


8. NIDDM


9. Rheumatoid arthritis


10. Glaucoma


11. Hx traumatic brain injury


12. OA


13. GERD





COMPLICATIONS/CHIEF COMPLAINT: Dementia Syncope And Collapse.





HISTORY OF PRESENT ILLNESS:


"84 year old male with PMH of Syncope in July, chronic Atrial fibrillation not 

on any anticoagulation, Advanced dementia intermittently incontinent., vascular 

versus Alzheimer's dementia,  Dyslipidemia. Essential hypertension.Obstructive 

sleep apnea, Non-insulin-dependent diabetes mellitus, Glaucoma, Rheumatoid 

arthritis, History of traumatic brain injury in 1950s, Gastroesophageal reflux 

disease, Osteoarthritis was brought to ED for an episode of syncope at home 

today. He has about 1 episode every month. Today he was sitting in a stool in 

the kitchen while his wife was baking there. He tried to get up but his wife 

noticed him turning grey and his eyes drooping he slump forward towards the 

kitchen counter. His wife caught hold of him before his head hit the stone 

counter. He was making gargling noises in his throat. His wife leaned him back 

into the chair and positioned herself in front of him to prevent him from 

sliding down to the floor. Subsequently he woke up after application of cold 

towel. He had urinary incontinence during the episode but there was no seizure 

like activity. Wife unsure how long he was passed out for. Patient's dementia 

has been progressing rapidly and family has applied to [facility] for long term 

placement."





HOSPITAL COURSE: 


Patient was evaluated by both neurology and cardiology during course of 

admission. Noted to have orthostatic hypotension, medications were adjusted 

including discontinuing Aricept and Timolol. Symptoms had resolved since then 

and patient had been stable without any complaints of dizziness or further 

episodes of syncope. He continued to get PT during course of admission. He is 

discharged to [facility] and to follow up with PMD within 1 week of 

discharge. 





DISCHARGE MEDICATIONS: Please see below.


 


ALLERGIES: Please see below.





PHYSICAL EXAMINATION ON DISCHARGE:


VITAL SIGNS: Please see below.


General: No acute distress,  Alert


Eyes: Normal sclera, EOMI


HENT: Atraumatic


Cardiovascular: Normal rate, normal rhythm. 


Pulmonary: Clear to auscultation b/l, no wheezing


GI: Soft, nontender, nondistended


Skin: Warm and dry


Neuro: CN grossly intact. No focal deficits. Strengths equal b/l. 


Psych: oriented x 3





LABORATORY DATA: Please see below.





IMAGING: 


CT Angio brain-


Impression:


Vascular calcification in the carotid siphons bilaterally.  No evidence of


arteriovenous malformation or berry aneurysm.





Neck CTA-


Impression:


Direct aortic origin left vertebral artery with some calcific plaquing.  Heavy


calcific plaquing at the origin of the right vertebral artery.  Less than 50%


narrowing of the internal carotid arteries bilaterally.  Otherwise negative.





Vascular US-


IMPRESSION: 


Mild to moderate bilateral atherosclerotic changes in the proximal internal 


carotid arteries bilaterally with mild atherosclerotic changes in the distal 


left common carotid artery. Stenoses are less than 50% bilaterally at the 


carotid bifurcations consistent with a mild stenosis using SRU criteria. 





Hand XR-


Impression:


Osteoarthritis as described.


Changes at the index finger MCP articulation as described.





CXR-


IMPRESSION:


No acute infiltrate.





ACTIVITY: [As tolerated].





DIET: Consistent carbohydrate diet





DISCHARGE PLAN:


Discontinue Aricept and timolol


f/u PCP within 1 week





DISPOSITION: [facility].





DISCHARGE INSTRUCTIONS:


Discontinue Aricept and timolol


f/u PCP within 1 week





ITEMS TO FOLLOWUP ON ON OUTPATIENT:


None





DISCHARGE CONDITION: [Stable].





TIME SPENT ON DISCHARGE: 32 minutes.





Vital Signs/I&Os





Vital Signs








  Date Time  Temp Pulse Resp B/P (MAP) Pulse Ox O2 Delivery O2 Flow Rate FiO2


 


10/16/19 09:17    124/85    


 


10/16/19 06:00 97.3 69 16  98   














I&O- Last 24 Hours up to 6 AM 


 


 10/16/19





 06:00


 


Intake Total 1286 ml


 


Output Total 0 ml


 


Balance 1286 ml











Laboratory Data


Labs 24H


Laboratory Tests 2


10/15/19 16:50: Bedside Glucose (Misc Panel) 123H


10/15/19 19:18: Bedside Glucose (Misc Panel) 134H


10/16/19 06:31: Bedside Glucose (Misc Panel) 123H


10/16/19 11:30: Bedside Glucose (Misc Panel) 201H


FSBS





Laboratory Tests








Test


 10/15/19


16:50 10/15/19


19:18 10/16/19


06:31 10/16/19


11:30 Range/Units


 


 


Bedside Glucose (Misc Panel) 123 134 123 201   MG/DL











Discharge Medications


Scheduled


Aspirin (Aspir 81) 81 Mg Tablet.dr, 81 MG PO DAILY, (Reported)


Brimonidine Tartrate (Alphagan P) 0.1% 5ML Drops, 1 DROP OU BID, (Reported)


Dorzolamide HCl (Dorzolamide HCl) 2% 10ML Drops, 1 DROP OU BID


Folic Acid (Folic Acid) 1 Mg Tablet, 1 MG PO DAILY, (Reported)


Golimumab (Simponi) 100 Mg/1 Ml Syringe, 200 MG SC ASDIRECTED, (Reported)


   EVERY 8 WEEKS - RECEIVED AROUND 2 WEEKS AGO 


Latanoprost (Xalatan) 0.005% 2.5ML Drops, 1 DROP OU QHS, (Reported)


Lisinopril (Lisinopril) 10 Mg Tablet, 5 MG PO DAILY, (Reported)


Memantine HCl (Memantine HCl) 10 Mg Tablet, 10 MG PO BID, (Reported)


Metformin HCl (Metformin HCl) 500 Mg Tablet, 500 MG PO BID, (Reported)


Methotrexate Sodium (Methotrexate) 2.5 Mg Tablet, 12.5 MG PO QWEEK, (Reported)


   [place] 


Mirabegron (Myrbetriq) 50 Mg Tab.er.24h, 50 MG PO DAILY, (Reported)


Omeprazole (Omeprazole) 20 Mg Tablet.dr, 20 MG PO DAILY, (Reported)


Quetiapine Fumarate (Quetiapine Fumarate) 50 Mg Tablet, 50 MG PO BID, (Reported)


Rosuvastatin Calcium (Rosuvastatin Calcium) 40 Mg Tablet, 40 MG PO QHS, (Re

ported)





Allergies


Coded Allergies:  


     donepezil (Verified  Adverse Reaction, Severe, sinus pause 2.2 seconds 

10/1/19 on telemetry, 10/2/19)


     timolol (Verified  Adverse Reaction, Intermediate, hypotension, 10/2/19)


   


   hypotension











TOAN GARCES MD                Oct 16, 2019 12:03

## 2019-10-17 ENCOUNTER — HOSPITAL ENCOUNTER (OUTPATIENT)
Dept: HOSPITAL 53 - SKLAB6 | Age: 84
End: 2019-10-17
Attending: INTERNAL MEDICINE
Payer: MEDICARE

## 2019-10-17 DIAGNOSIS — Z79.899: Primary | ICD-10-CM

## 2019-10-17 LAB
ALBUMIN SERPL BCG-MCNC: 3.7 GM/DL (ref 3.2–5.2)
ALT SERPL W P-5'-P-CCNC: 27 U/L (ref 12–78)
BILIRUB SERPL-MCNC: 0.9 MG/DL (ref 0.2–1)
BUN SERPL-MCNC: 23 MG/DL (ref 7–18)
CALCIUM SERPL-MCNC: 8.5 MG/DL (ref 8.8–10.2)
CHLORIDE SERPL-SCNC: 107 MEQ/L (ref 98–107)
CHOLEST SERPL-MCNC: 97 MG/DL (ref ?–200)
CHOLEST/HDLC SERPL: 2.11 {RATIO} (ref ?–5)
CO2 SERPL-SCNC: 27 MEQ/L (ref 21–32)
CREAT SERPL-MCNC: 1.18 MG/DL (ref 0.7–1.3)
EST. AVERAGE GLUCOSE BLD GHB EST-MCNC: 131 MG/DL (ref 60–110)
GFR SERPL CREATININE-BSD FRML MDRD: > 60 ML/MIN/{1.73_M2} (ref 35–?)
GLUCOSE SERPL-MCNC: 114 MG/DL (ref 70–100)
HCT VFR BLD AUTO: 40.1 % (ref 42–52)
HDLC SERPL-MCNC: 46 MG/DL (ref 40–?)
HGB BLD-MCNC: 13 G/DL (ref 13.5–17.5)
LDLC SERPL CALC-MCNC: 27 MG/DL (ref ?–100)
MCH RBC QN AUTO: 30.7 PG (ref 27–33)
MCHC RBC AUTO-ENTMCNC: 32.4 G/DL (ref 32–36.5)
MCV RBC AUTO: 94.6 FL (ref 80–96)
NONHDLC SERPL-MCNC: 51 MG/DL
PLATELET # BLD AUTO: 142 10^3/UL (ref 150–450)
POTASSIUM SERPL-SCNC: 4.3 MEQ/L (ref 3.5–5.1)
PROT SERPL-MCNC: 7.5 GM/DL (ref 6.4–8.2)
RBC # BLD AUTO: 4.24 10^6/UL (ref 4.3–6.1)
SODIUM SERPL-SCNC: 140 MEQ/L (ref 136–145)
TRIGL SERPL-MCNC: 120 MG/DL (ref ?–150)
WBC # BLD AUTO: 9.2 10^3/UL (ref 4–10)

## 2019-11-14 ENCOUNTER — HOSPITAL ENCOUNTER (OUTPATIENT)
Dept: HOSPITAL 53 - SKLAB6 | Age: 84
End: 2019-11-14
Attending: INTERNAL MEDICINE
Payer: MEDICARE

## 2019-11-14 DIAGNOSIS — Z79.899: Primary | ICD-10-CM

## 2019-11-14 LAB
HCT VFR BLD AUTO: 39.9 % (ref 42–52)
HGB BLD-MCNC: 13.1 G/DL (ref 13.5–17.5)
MCH RBC QN AUTO: 30.5 PG (ref 27–33)
MCHC RBC AUTO-ENTMCNC: 32.8 G/DL (ref 32–36.5)
MCV RBC AUTO: 92.8 FL (ref 80–96)
PLATELET # BLD AUTO: 175 10^3/UL (ref 150–450)
RBC # BLD AUTO: 4.3 10^6/UL (ref 4.3–6.1)
WBC # BLD AUTO: 9.4 10^3/UL (ref 4–10)

## 2019-12-12 ENCOUNTER — HOSPITAL ENCOUNTER (OUTPATIENT)
Dept: HOSPITAL 53 - SKLAB6 | Age: 84
End: 2019-12-12
Attending: INTERNAL MEDICINE
Payer: MEDICARE

## 2019-12-12 DIAGNOSIS — Z79.899: Primary | ICD-10-CM

## 2019-12-12 DIAGNOSIS — M06.9: ICD-10-CM

## 2019-12-12 LAB
ALBUMIN SERPL BCG-MCNC: 4 GM/DL (ref 3.2–5.2)
ALT SERPL W P-5'-P-CCNC: 21 U/L (ref 12–78)
BILIRUB SERPL-MCNC: 0.9 MG/DL (ref 0.2–1)
BUN SERPL-MCNC: 30 MG/DL (ref 7–18)
CALCIUM SERPL-MCNC: 9 MG/DL (ref 8.8–10.2)
CHLORIDE SERPL-SCNC: 106 MEQ/L (ref 98–107)
CO2 SERPL-SCNC: 30 MEQ/L (ref 21–32)
CREAT SERPL-MCNC: 1.3 MG/DL (ref 0.7–1.3)
GFR SERPL CREATININE-BSD FRML MDRD: 56.1 ML/MIN/{1.73_M2} (ref 35–?)
GLUCOSE SERPL-MCNC: 92 MG/DL (ref 70–100)
HCT VFR BLD AUTO: 36.9 % (ref 42–52)
HGB BLD-MCNC: 11.8 G/DL (ref 13.5–17.5)
MCH RBC QN AUTO: 30.1 PG (ref 27–33)
MCHC RBC AUTO-ENTMCNC: 32 G/DL (ref 32–36.5)
MCV RBC AUTO: 94.1 FL (ref 80–96)
PLATELET # BLD AUTO: 167 10^3/UL (ref 150–450)
POTASSIUM SERPL-SCNC: 4.2 MEQ/L (ref 3.5–5.1)
PROT SERPL-MCNC: 7.5 GM/DL (ref 6.4–8.2)
RBC # BLD AUTO: 3.92 10^6/UL (ref 4.3–6.1)
SODIUM SERPL-SCNC: 140 MEQ/L (ref 136–145)
WBC # BLD AUTO: 7.2 10^3/UL (ref 4–10)

## 2020-01-09 ENCOUNTER — HOSPITAL ENCOUNTER (OUTPATIENT)
Dept: HOSPITAL 53 - SKLAB6 | Age: 85
End: 2020-01-09
Attending: INTERNAL MEDICINE
Payer: MEDICARE

## 2020-01-09 DIAGNOSIS — K21.9: ICD-10-CM

## 2020-01-09 DIAGNOSIS — E11.9: Primary | ICD-10-CM

## 2020-01-09 DIAGNOSIS — I10: ICD-10-CM

## 2020-01-09 LAB
HCT VFR BLD AUTO: 37.1 % (ref 42–52)
HGB BLD-MCNC: 11.8 G/DL (ref 13.5–17.5)
MCH RBC QN AUTO: 30.6 PG (ref 27–33)
MCHC RBC AUTO-ENTMCNC: 31.8 G/DL (ref 32–36.5)
MCV RBC AUTO: 96.4 FL (ref 80–96)
PLATELET # BLD AUTO: 179 10^3/UL (ref 150–450)
RBC # BLD AUTO: 3.85 10^6/UL (ref 4.3–6.1)
WBC # BLD AUTO: 9.2 10^3/UL (ref 4–10)

## 2020-01-13 ENCOUNTER — HOSPITAL ENCOUNTER (OUTPATIENT)
Dept: HOSPITAL 53 - SKLAB6 | Age: 85
End: 2020-01-13
Attending: INTERNAL MEDICINE
Payer: MEDICARE

## 2020-01-13 DIAGNOSIS — M05.79: Primary | ICD-10-CM

## 2020-01-13 DIAGNOSIS — Z79.899: ICD-10-CM

## 2020-01-13 DIAGNOSIS — F03.90: ICD-10-CM

## 2020-01-13 LAB
ALBUMIN SERPL BCG-MCNC: 4 GM/DL (ref 3.2–5.2)
ALT SERPL W P-5'-P-CCNC: 17 U/L (ref 12–78)
BASOPHILS # BLD AUTO: 0 10^3/UL (ref 0–0.2)
BASOPHILS NFR BLD AUTO: 0.3 % (ref 0–1)
BILIRUB SERPL-MCNC: 0.9 MG/DL (ref 0.2–1)
BUN SERPL-MCNC: 28 MG/DL (ref 7–18)
CALCIUM SERPL-MCNC: 9.3 MG/DL (ref 8.8–10.2)
CHLORIDE SERPL-SCNC: 104 MEQ/L (ref 98–107)
CO2 SERPL-SCNC: 31 MEQ/L (ref 21–32)
CREAT SERPL-MCNC: 1.32 MG/DL (ref 0.7–1.3)
CRP SERPL-MCNC: < 0.3 MG/DL (ref 0–0.3)
EOSINOPHIL # BLD AUTO: 0.1 10^3/UL (ref 0–0.5)
EOSINOPHIL NFR BLD AUTO: 1.6 % (ref 0–3)
ERYTHROCYTE [SEDIMENTATION RATE] IN BLOOD BY WESTERGREN METHOD: 48 MM/HR (ref 0–20)
GFR SERPL CREATININE-BSD FRML MDRD: 55.1 ML/MIN/{1.73_M2} (ref 35–?)
GLUCOSE SERPL-MCNC: 90 MG/DL (ref 70–100)
HBV SURFACE AB SER QL: NEGATIVE
HCT VFR BLD AUTO: 35 % (ref 42–52)
HCV AB SER QL: < 0 INDEX (ref ?–0.8)
HGB BLD-MCNC: 11.6 G/DL (ref 13.5–17.5)
LYMPHOCYTES # BLD AUTO: 2.7 10^3/UL (ref 1.5–5)
LYMPHOCYTES NFR BLD AUTO: 33.6 % (ref 24–44)
MCH RBC QN AUTO: 31.7 PG (ref 27–33)
MCHC RBC AUTO-ENTMCNC: 33.1 G/DL (ref 32–36.5)
MCV RBC AUTO: 95.6 FL (ref 80–96)
MONOCYTES # BLD AUTO: 0.6 10^3/UL (ref 0–0.8)
MONOCYTES NFR BLD AUTO: 7.4 % (ref 0–5)
NEUTROPHILS # BLD AUTO: 4.5 10^3/UL (ref 1.5–8.5)
NEUTROPHILS NFR BLD AUTO: 56.8 % (ref 36–66)
PLATELET # BLD AUTO: 174 10^3/UL (ref 150–450)
POTASSIUM SERPL-SCNC: 4.8 MEQ/L (ref 3.5–5.1)
PROT SERPL-MCNC: 7.6 GM/DL (ref 6.4–8.2)
RBC # BLD AUTO: 3.66 10^6/UL (ref 4.3–6.1)
SODIUM SERPL-SCNC: 138 MEQ/L (ref 136–145)
WBC # BLD AUTO: 7.9 10^3/UL (ref 4–10)

## 2020-02-06 ENCOUNTER — HOSPITAL ENCOUNTER (OUTPATIENT)
Dept: HOSPITAL 53 - SKLAB6 | Age: 85
End: 2020-02-06
Attending: INTERNAL MEDICINE
Payer: MEDICARE

## 2020-02-06 DIAGNOSIS — Z79.899: Primary | ICD-10-CM

## 2020-02-13 ENCOUNTER — HOSPITAL ENCOUNTER (OUTPATIENT)
Dept: HOSPITAL 53 - SKLAB6 | Age: 85
End: 2020-02-13
Attending: INTERNAL MEDICINE
Payer: MEDICARE

## 2020-02-13 DIAGNOSIS — Z79.899: Primary | ICD-10-CM

## 2020-02-13 LAB
ALBUMIN SERPL BCG-MCNC: 3.7 GM/DL (ref 3.2–5.2)
ALT SERPL W P-5'-P-CCNC: 22 U/L (ref 12–78)
BILIRUB SERPL-MCNC: 0.8 MG/DL (ref 0.2–1)
BUN SERPL-MCNC: 21 MG/DL (ref 7–18)
CALCIUM SERPL-MCNC: 8.7 MG/DL (ref 8.8–10.2)
CHLORIDE SERPL-SCNC: 106 MEQ/L (ref 98–107)
CO2 SERPL-SCNC: 30 MEQ/L (ref 21–32)
CREAT SERPL-MCNC: 1.23 MG/DL (ref 0.7–1.3)
GFR SERPL CREATININE-BSD FRML MDRD: 59.8 ML/MIN/{1.73_M2} (ref 35–?)
GLUCOSE SERPL-MCNC: 123 MG/DL (ref 70–100)
HCT VFR BLD AUTO: 31.2 % (ref 42–52)
HGB BLD-MCNC: 10 G/DL (ref 13.5–17.5)
MCH RBC QN AUTO: 31.1 PG (ref 27–33)
MCHC RBC AUTO-ENTMCNC: 32.1 G/DL (ref 32–36.5)
MCV RBC AUTO: 96.9 FL (ref 80–96)
PLATELET # BLD AUTO: 158 10^3/UL (ref 150–450)
POTASSIUM SERPL-SCNC: 4.3 MEQ/L (ref 3.5–5.1)
PROT SERPL-MCNC: 6.9 GM/DL (ref 6.4–8.2)
RBC # BLD AUTO: 3.22 10^6/UL (ref 4.3–6.1)
SODIUM SERPL-SCNC: 140 MEQ/L (ref 136–145)
WBC # BLD AUTO: 5.6 10^3/UL (ref 4–10)

## 2020-02-27 ENCOUNTER — HOSPITAL ENCOUNTER (OUTPATIENT)
Dept: HOSPITAL 53 - SKLAB6 | Age: 85
End: 2020-02-27
Attending: INTERNAL MEDICINE
Payer: MEDICARE

## 2020-02-27 DIAGNOSIS — Z79.899: Primary | ICD-10-CM

## 2020-03-03 ENCOUNTER — HOSPITAL ENCOUNTER (OUTPATIENT)
Dept: HOSPITAL 53 - SKLAB6 | Age: 85
End: 2020-03-03
Attending: INTERNAL MEDICINE
Payer: MEDICARE

## 2020-03-03 DIAGNOSIS — Z79.899: Primary | ICD-10-CM

## 2020-03-03 DIAGNOSIS — G30.1: ICD-10-CM

## 2020-03-03 DIAGNOSIS — R55: Primary | ICD-10-CM

## 2020-03-03 LAB
BUN SERPL-MCNC: 30 MG/DL (ref 7–18)
CALCIUM SERPL-MCNC: 8.5 MG/DL (ref 8.8–10.2)
CHLORIDE SERPL-SCNC: 107 MEQ/L (ref 98–107)
CO2 SERPL-SCNC: 27 MEQ/L (ref 21–32)
CREAT SERPL-MCNC: 1.35 MG/DL (ref 0.7–1.3)
GFR SERPL CREATININE-BSD FRML MDRD: 53.7 ML/MIN/{1.73_M2} (ref 35–?)
GLUCOSE SERPL-MCNC: 120 MG/DL (ref 70–100)
HCT VFR BLD AUTO: 29.8 % (ref 42–52)
HGB BLD-MCNC: 10 G/DL (ref 13.5–17.5)
MCH RBC QN AUTO: 31.3 PG (ref 27–33)
MCHC RBC AUTO-ENTMCNC: 33.6 G/DL (ref 32–36.5)
MCV RBC AUTO: 93.1 FL (ref 80–96)
PLATELET # BLD AUTO: 205 10^3/UL (ref 150–450)
POTASSIUM SERPL-SCNC: 4.6 MEQ/L (ref 3.5–5.1)
RBC # BLD AUTO: 3.2 10^6/UL (ref 4.3–6.1)
SODIUM SERPL-SCNC: 137 MEQ/L (ref 136–145)
VALPROATE SERPL-MCNC: < 3 UG/ML (ref 50–100)
WBC # BLD AUTO: 6 10^3/UL (ref 4–10)

## 2020-03-05 ENCOUNTER — HOSPITAL ENCOUNTER (INPATIENT)
Dept: HOSPITAL 53 - M ED | Age: 85
LOS: 1 days | Discharge: NURSING, REHAB OR LONG-TERM CARE | DRG: 312 | End: 2020-03-06
Attending: INTERNAL MEDICINE | Admitting: INTERNAL MEDICINE
Payer: OTHER GOVERNMENT

## 2020-03-05 VITALS — WEIGHT: 205.03 LBS | BODY MASS INDEX: 25.49 KG/M2 | HEIGHT: 75 IN

## 2020-03-05 VITALS — SYSTOLIC BLOOD PRESSURE: 132 MMHG | DIASTOLIC BLOOD PRESSURE: 75 MMHG

## 2020-03-05 DIAGNOSIS — R55: Primary | ICD-10-CM

## 2020-03-05 DIAGNOSIS — K21.9: ICD-10-CM

## 2020-03-05 DIAGNOSIS — Z79.899: ICD-10-CM

## 2020-03-05 DIAGNOSIS — F03.90: ICD-10-CM

## 2020-03-05 DIAGNOSIS — G47.33: ICD-10-CM

## 2020-03-05 DIAGNOSIS — Z87.820: ICD-10-CM

## 2020-03-05 DIAGNOSIS — Z79.82: ICD-10-CM

## 2020-03-05 DIAGNOSIS — Z88.8: ICD-10-CM

## 2020-03-05 DIAGNOSIS — E11.9: ICD-10-CM

## 2020-03-05 DIAGNOSIS — E78.5: ICD-10-CM

## 2020-03-05 DIAGNOSIS — I10: ICD-10-CM

## 2020-03-05 DIAGNOSIS — I48.91: ICD-10-CM

## 2020-03-05 DIAGNOSIS — M06.9: ICD-10-CM

## 2020-03-05 DIAGNOSIS — H40.9: ICD-10-CM

## 2020-03-05 DIAGNOSIS — Z79.84: ICD-10-CM

## 2020-03-05 DIAGNOSIS — Z66: ICD-10-CM

## 2020-03-05 LAB
BASOPHILS # BLD AUTO: 0 10^3/UL (ref 0–0.2)
BASOPHILS NFR BLD AUTO: 0.4 % (ref 0–1)
BUN SERPL-MCNC: 30 MG/DL (ref 7–18)
CALCIUM SERPL-MCNC: 8.9 MG/DL (ref 8.8–10.2)
CHLORIDE SERPL-SCNC: 105 MEQ/L (ref 98–107)
CK MB CFR.DF SERPL CALC: 0.54
CK MB CFR.DF SERPL CALC: 0.56
CK MB SERPL-MCNC: 1.2 NG/ML (ref ?–3.6)
CK MB SERPL-MCNC: 1.6 NG/ML (ref ?–3.6)
CK SERPL-CCNC: 223 U/L (ref 39–308)
CK SERPL-CCNC: 285 U/L (ref 39–308)
CO2 SERPL-SCNC: 29 MEQ/L (ref 21–32)
CREAT SERPL-MCNC: 1.27 MG/DL (ref 0.7–1.3)
EOSINOPHIL # BLD AUTO: 0.1 10^3/UL (ref 0–0.5)
EOSINOPHIL NFR BLD AUTO: 0.8 % (ref 0–3)
GFR SERPL CREATININE-BSD FRML MDRD: 57.7 ML/MIN/{1.73_M2} (ref 35–?)
GLUCOSE SERPL-MCNC: 112 MG/DL (ref 70–100)
HCT VFR BLD AUTO: 29.6 % (ref 42–52)
HGB BLD-MCNC: 9.8 G/DL (ref 13.5–17.5)
LYMPHOCYTES # BLD AUTO: 1.2 10^3/UL (ref 1.5–5)
LYMPHOCYTES NFR BLD AUTO: 17.3 % (ref 24–44)
MAGNESIUM SERPL-MCNC: 1.9 MG/DL (ref 1.8–2.4)
MCH RBC QN AUTO: 31 PG (ref 27–33)
MCHC RBC AUTO-ENTMCNC: 33.1 G/DL (ref 32–36.5)
MCV RBC AUTO: 93.7 FL (ref 80–96)
MONOCYTES # BLD AUTO: 0.5 10^3/UL (ref 0–0.8)
MONOCYTES NFR BLD AUTO: 7.5 % (ref 0–5)
NEUTROPHILS # BLD AUTO: 5.2 10^3/UL (ref 1.5–8.5)
NEUTROPHILS NFR BLD AUTO: 73.7 % (ref 36–66)
PLATELET # BLD AUTO: 174 10^3/UL (ref 150–450)
POTASSIUM SERPL-SCNC: 4.3 MEQ/L (ref 3.5–5.1)
RBC # BLD AUTO: 3.16 10^6/UL (ref 4.3–6.1)
SODIUM SERPL-SCNC: 136 MEQ/L (ref 136–145)
T4 FREE SERPL-MCNC: 1 NG/DL (ref 0.76–1.46)
TROPONIN I SERPL-MCNC: < 0.02 NG/ML (ref ?–0.1)
TROPONIN I SERPL-MCNC: < 0.02 NG/ML (ref ?–0.1)
TSH SERPL DL<=0.005 MIU/L-ACNC: 2.96 UIU/ML (ref 0.36–3.74)
WBC # BLD AUTO: 7.1 10^3/UL (ref 4–10)

## 2020-03-05 RX ADMIN — INSULIN LISPRO SCH UNITS: 100 INJECTION, SOLUTION INTRAVENOUS; SUBCUTANEOUS at 17:30

## 2020-03-05 RX ADMIN — CARBAMIDE PEROXIDE 6.5% SCH DROP: 6.5 LIQUID AURICULAR (OTIC) at 21:00

## 2020-03-05 RX ADMIN — BRIMONIDINE TARTRATE SCH DROP: 1.5 SOLUTION OPHTHALMIC at 22:25

## 2020-03-05 NOTE — HPEPDOC
[facility] Medical History & Physical


Date of Admission


Mar 5, 2020


Date of Service:  Mar 5, 2020





History and Physical


CHIEF COMPLAINT: Syncope   





HISTORY OF PRESENT ILLNESS: 85 yo male sent from [facility] nursing home for two 

episodes of syncope over the past few days.  No clear illiciting factors. 

Patient is demented and unable to provide any useful information, and is a poor 

historian. Denies any medical complaints.





PAST MEDICAL HISTORY:


#HTN


#HLD


#GERD


#JOSE


#DM


#RA


#closed TBI


#dementia


#glaucoma


#afib?





ALLERGIES: Please see below.





REVIEW OF SYSTEMS:


Negative except as per HPI.





HOME MEDICATIONS: Please see below. 





PHYSICAL EXAMINATION:


VITAL SIGNS: See below


General: NAD, lying comfortably in bed, pleasantly confused


HEENT: NC/AT, EOMI, PERRL


Lungs: CTA B/L


Heart: +S1S2, irregularly irregular


Abd: soft, NT, +BS, obese


Ext: no edema





LABORATORY DATA: See below.





MICROBIOLOGY: Please see below. 





ASSESSMENT: 85 yo male with PMHx for HTN, HLD, GERD, JOSE, DM, RA, closed TBI, 

dementia sent from [facility] for syncopal episodes over the past few 

days, noted to have episode of asystole in ambulance during transfer.





#syncope


- telemetry monitoring


- echocardiogram


- possible arrhythmia - holding BB for now


- fall precautions


- PT/OT


- check orthostatics





#afib


- new? not documented in recent clinic notes


- echocardiogram pending


- telemetry monitoring


- to evaluate utility of anti-coagulation





#HTN


- continue lisinopril with hold parameters


- BB on hold





#HLD


- continue Crestor





#RA


- MTX once weekly





#DM


- home metformin on hold


- ISS and FS qac/hs while inpatient





#GERD


- continue home prilosec





#DVT prophylaxis


- heparin SC





CODE status: MOLST reviewed - DNR/DNI, limited intervention





Vital Signs





Vital Signs








  Date Time  Temp Pulse Resp B/P (MAP) Pulse Ox O2 Delivery O2 Flow Rate FiO2


 


3/5/20 11:20  65  116/56 (76)    





  69  103/68 (80)    





        


 


3/5/20 10:39 97.9       


 


3/5/20 10:25   14  99   











Laboratory Data


Labs 24H


Laboratory Tests 2


3/5/20 10:48: Bedside Glucose (Misc Panel) 110


3/5/20 11:17: 


Immature Granulocyte % (Auto) 0.3, Neutrophils (%) (Auto) 73.7H, Lymphocytes (%)

(Auto) 17.3L, Monocytes (%) (Auto) 7.5H, Eosinophils (%) (Auto) 0.8, Basophils 

(%) (Auto) 0.4, Neutrophils # (Auto) 5.2, Lymphocytes # (Auto) 1.2L, Monocytes #

(Auto) 0.5, Eosinophils # (Auto) 0.1, Basophils # (Auto) 0.0, Nucleated Red 

Blood Cells % (auto) 0.0, Anion Gap 2L, Glomerular Filtration Rate 57.7, Calcium

Level 8.9, Magnesium Level 1.9, Total Creatine Kinase 285, Creatine Kinase MB 

1.6, Creatine Kinase MB Relative Index 0.56, Troponin I < 0.02, Thyroid 

Stimulating Hormone (TSH) 2.960, Free Thyroxine 1.00


CBC/BMP


Laboratory Tests


3/5/20 11:17











Home Medications


Scheduled


Aspirin (Aspirin) 81 Mg Tab.chew, 81 MG PO DAILY


Atenolol (Atenolol) 25 Mg Tablet, 12.5 MG PO QHS


Brimonidine Tartrate (Brimonidine Tartrate) 0.15% 5ML Drops, 1 DROP OU BID


Carbamide Peroxide (Debrox) 15 Ml Drops, 5 DROP AU BID


   ordered 03/02/20 take for 4 days 


Folic Acid (Folic Acid) 1 Mg Tablet, 1 MG PO DAILY


Latanoprost (Xalatan) 0.005% 2.5ML Drops, 1 DROP OU QHS


Lisinopril (Lisinopril) 2.5 Mg Tablet, 2.5 MG PO QHS


Metformin HCl (Metformin HCl) 500 Mg Tablet, 500 MG PO BID


Methotrexate Sodium (Methotrexate) 2.5 Mg Tablet, 12.5 MG PO QWEEK


   [place] 


Omeprazole (Omeprazole) 20 Mg Tablet.dr, 20 MG PO DAILY


Quetiapine Fumarate (Seroquel) 25 Mg Tablet, 25 MG PO QHS


Rosuvastatin Calcium (Rosuvastatin Calcium) 40 Mg Tablet, 40 MG PO QHS


Sennosides/Docusate Sodium (Senna Plus Tablet) 1 Each Tablet, 1 TAB PO DAILY





Scheduled PRN


Acetaminophen (Acetaminophen) 325 Mg Tablet, 650 MG PO Q4H PRN for PAIN OR FEVER


Bisacodyl (Dulcolax) 10 Mg Supp.rect, 10 MG TN DAILY PRN for CONSTIPATION


Magnesium Hydroxide (Milk of Magnesia) 400 Mg/5 Ml Oral.susp, 2,400 MG PO DAILY 

PRN for CONSTIPATION


Menthol (Bengay) 5% Gel..gram., 1 DOSE TOP TID PRN for PAIN


   apply to shoulders and back 


Phenyleph/Pramoxin/Glycr/W.pet (Preparation H Cream) 26 Gm Cream..g., 1 DOSE TOP

BID PRN for HEMORRHOIDS





Allergies


Coded Allergies:  


     donepezil (Verified  Adverse Reaction, Severe, sinus pause 2.2 seconds 10/

1/19 on telemetry, 10/2/19)


     timolol (Verified  Adverse Reaction, Intermediate, hypotension, 10/2/19)


   


   hypotension





A-FIB/CHADSVASC


A-FIB History


Current/History of A-Fib/PAF?:  Yes


Current PO Anticoag Therapy:  No





Age/Risk Factor Scoring


CHADSVASC:  








CHADSVASC Response (Comments) Value


 


Age Risk Factor Age >/= 75 years old 2


 


Gender Risk Factor Male 0


 


Hx of CHF No 0


 


Hx of HTN Yes 1


 


Hx of Stroke/TIA/or VTE No 0


 


Hx of Diabetes Yes 1


 


Hx of Vascular Disease No 0


 


Total  4











Treatment


Treatment ordered:  Holding Other


Reason Anticoagulant not given:  Other


Other reason anticoagulant not:  pending PRISCILA Frankel MD               Mar 5, 2020 13:25

## 2020-03-05 NOTE — REP
CHEST, PORTABLE:

 

AP portable view of the chest is performed.

 

There is no acute infiltrate. Cardiac silhouette is mildly prominent. Mediastinal

silhouette is unchanged.

 

IMPRESSION:

 

No acute pulmonary disease. Possible mild cardiomegaly.

 

 

Electronically Signed by

Joaquín Rosas MD 03/05/2020 08:04 P

## 2020-03-05 NOTE — ECGEPIP
[facility] - ED

                                       

                                       Test Date:    2020

Pat Name:     KEITH DAILY          Department:   

Patient ID:   [id number]                 Room:         -

Gender:       Male                     Technician:   crystalbree

:          1935               Requested By: Margie Storm 

Order Number: [id number]     Reading MD:   Jeromy Montgomery

                                 Measurements

Intervals                              Axis          

Rate:         76                       P:            

KS:           0                        QRS:          58

QRSD:         95                       T:            0

QT:           405                                    

QTc:          456                                    

                           Interpretive Statements

ATRIAL FIBRILLATION

RATE CHANGE COMPARED TO 3/4/20

Electronically Signed on 3-5-2020 17:22:50 EST by Jeromy Montgomery

## 2020-03-06 VITALS — DIASTOLIC BLOOD PRESSURE: 81 MMHG | SYSTOLIC BLOOD PRESSURE: 133 MMHG

## 2020-03-06 VITALS — DIASTOLIC BLOOD PRESSURE: 74 MMHG | SYSTOLIC BLOOD PRESSURE: 131 MMHG

## 2020-03-06 VITALS — DIASTOLIC BLOOD PRESSURE: 54 MMHG | SYSTOLIC BLOOD PRESSURE: 112 MMHG

## 2020-03-06 VITALS — SYSTOLIC BLOOD PRESSURE: 132 MMHG | DIASTOLIC BLOOD PRESSURE: 85 MMHG

## 2020-03-06 LAB
ALBUMIN SERPL BCG-MCNC: 3.4 GM/DL (ref 3.2–5.2)
ALT SERPL W P-5'-P-CCNC: 20 U/L (ref 12–78)
BILIRUB SERPL-MCNC: 0.7 MG/DL (ref 0.2–1)
BUN SERPL-MCNC: 24 MG/DL (ref 7–18)
CALCIUM SERPL-MCNC: 8.2 MG/DL (ref 8.8–10.2)
CHLORIDE SERPL-SCNC: 110 MEQ/L (ref 98–107)
CO2 SERPL-SCNC: 24 MEQ/L (ref 21–32)
CREAT SERPL-MCNC: 1.07 MG/DL (ref 0.7–1.3)
GFR SERPL CREATININE-BSD FRML MDRD: > 60 ML/MIN/{1.73_M2} (ref 35–?)
GLUCOSE SERPL-MCNC: 81 MG/DL (ref 70–100)
HCT VFR BLD AUTO: 29.5 % (ref 42–52)
HGB BLD-MCNC: 9.9 G/DL (ref 13.5–17.5)
MCH RBC QN AUTO: 31 PG (ref 27–33)
MCHC RBC AUTO-ENTMCNC: 33.6 G/DL (ref 32–36.5)
MCV RBC AUTO: 92.5 FL (ref 80–96)
PLATELET # BLD AUTO: 149 10^3/UL (ref 150–450)
POTASSIUM SERPL-SCNC: 3.9 MEQ/L (ref 3.5–5.1)
PROT SERPL-MCNC: 6.6 GM/DL (ref 6.4–8.2)
RBC # BLD AUTO: 3.19 10^6/UL (ref 4.3–6.1)
SODIUM SERPL-SCNC: 141 MEQ/L (ref 136–145)
WBC # BLD AUTO: 6.3 10^3/UL (ref 4–10)

## 2020-03-06 RX ADMIN — BRIMONIDINE TARTRATE SCH DROP: 1.5 SOLUTION OPHTHALMIC at 09:03

## 2020-03-06 RX ADMIN — INSULIN LISPRO SCH UNITS: 100 INJECTION, SOLUTION INTRAVENOUS; SUBCUTANEOUS at 12:37

## 2020-03-06 RX ADMIN — INSULIN LISPRO SCH UNITS: 100 INJECTION, SOLUTION INTRAVENOUS; SUBCUTANEOUS at 07:30

## 2020-03-06 RX ADMIN — CARBAMIDE PEROXIDE 6.5% SCH DROP: 6.5 LIQUID AURICULAR (OTIC) at 09:04

## 2020-03-06 NOTE — ECGEPIP
[facility]

                                       

                                       Test Date:    2020

Pat Name:     KEITH DAILY          Department:   

Patient ID:   [id number]                 Room:         -

Gender:       Male                     Technician:   

:          [id number]               Requested By: CARMEN MEANS [facility]

Order Number: [id number]     Reading MD:   Og Lane

                                 Measurements

Intervals                              Axis          

Rate:         102                      P:            

NM:           0                        QRS:          64

QRSD:         91                       T:            -2

QT:           351                                    

QTc:          459                                    

                           Interpretive Statements

ATRIAL FIBRILLATION WITH RAPID VENTRICULAR RESPONSE WITH ABERRANT CONDUCTION

OR 

VENTRICULAR PREMATURE COMPLEXES

Nonspecific T wave abnormality

Similar to tracing done 19

Baseline artifact

Electronically Signed on 3-6-2020 14:37:17 EST by Og Lane

## 2020-03-06 NOTE — IPNPDOC
Text Note


Date of Service


The patient was seen on 3/6/20.





NOTE


Subjective:


Patient seen and examined at bedside. Reported episode of agitation last 

evening, and again early this morning. He did not receive his pm Seroquel. This 

morning during examination he had no complaints. Pleasantly confused.





Objective:


PHYSICAL EXAMINATION:


VITAL SIGNS: See below


General: NAD, sitting comfortably in chair, in good spirits, confused


HEENT: NC/AT, EOMI, PERRL


Lungs: CTA B/L


Heart: +S1S2, irregularly irregular


Abd: soft, NT, +BS, obese


Ext: no edema








ASSESSMENT: 83 yo male with PMHx for HTN, HLD, GERD, JOSE, DM, RA, closed TBI, 

dementia sent from [facility] for syncopal episodes over the past few 

days, noted to have episode of asystole in ambulance during transfer.





#syncope


- telemetry monitoring


- echocardiogram


- possible arrhythmia - holding BB for now


- fall precautions


- PT/OT


- check orthostatics





#afib


- new? not documented in recent clinic notes


- echocardiogram pending


- telemetry monitoring


- will d/w family regarding anti-coagulation





#HTN


- continue lisinopril with hold parameters


- BB on hold





#HLD


- continue Crestor





#RA


- MTX once weekly





#DM


- home metformin on hold


- ISS and FS qac/hs while inpatient





#JOSE


#closed TBI


#dementia


#glaucoma





#GERD


- continue home Prilosec





#DVT prophylaxis


- heparin SC





CODE status: MOLST reviewed - DNR/DNI, limited intervention





Dispo: pending echocardiogram, further telemetry monitoring





VS,Fishbone, I+O


VS, Fishbone, I+O


Laboratory Tests


3/5/20 11:17








3/6/20 05:48











Vital Signs








  Date Time  Temp Pulse Resp B/P (MAP) Pulse Ox O2 Delivery O2 Flow Rate FiO2


 


3/6/20 08:18 98.4 78 14 132/85 (101) 98 Room Air  














I&O- Last 24 Hours up to 6 AM 


 


 3/6/20





 06:00


 


Intake Total 0 ml


 


Output Total 0 ml


 


Balance 0 ml

















PRISCILA ONEAL MD               Mar 6, 2020 10:14

## 2020-03-17 ENCOUNTER — HOSPITAL ENCOUNTER (OUTPATIENT)
Dept: HOSPITAL 53 - SKLAB6 | Age: 85
End: 2020-03-17
Attending: INTERNAL MEDICINE
Payer: OTHER GOVERNMENT

## 2020-03-17 DIAGNOSIS — Z79.899: Primary | ICD-10-CM

## 2020-03-17 LAB
BUN SERPL-MCNC: 28 MG/DL (ref 7–18)
CALCIUM SERPL-MCNC: 8.5 MG/DL (ref 8.8–10.2)
CHLORIDE SERPL-SCNC: 106 MEQ/L (ref 98–107)
CO2 SERPL-SCNC: 27 MEQ/L (ref 21–32)
CREAT SERPL-MCNC: 1.24 MG/DL (ref 0.7–1.3)
GFR SERPL CREATININE-BSD FRML MDRD: 59.3 ML/MIN/{1.73_M2} (ref 35–?)
GLUCOSE SERPL-MCNC: 151 MG/DL (ref 70–100)
HCT VFR BLD AUTO: 31.3 % (ref 42–52)
HGB BLD-MCNC: 10.4 G/DL (ref 13.5–17.5)
MCH RBC QN AUTO: 31 PG (ref 27–33)
MCHC RBC AUTO-ENTMCNC: 33.2 G/DL (ref 32–36.5)
MCV RBC AUTO: 93.4 FL (ref 80–96)
PLATELET # BLD AUTO: 181 10^3/UL (ref 150–450)
POTASSIUM SERPL-SCNC: 4.4 MEQ/L (ref 3.5–5.1)
RBC # BLD AUTO: 3.35 10^6/UL (ref 4.3–6.1)
SODIUM SERPL-SCNC: 138 MEQ/L (ref 136–145)
WBC # BLD AUTO: 8.3 10^3/UL (ref 4–10)

## 2020-03-17 NOTE — DS.PDOC
Discharge Summary


General


Date of Admission


Mar 5, 2020 at 13:12


Date of Discharge


3/6/20





Discharge Summary


PROCEDURES PERFORMED DURING STAY: [None].





DISCHARGE DIAGNOSES:


#HTN


#HLD


#GERD


#JOSE


#DM


#RA


#closed TBI


#dementia


#glaucoma


#afib





Hospital Course: 83 yo male sent from [facility] nursing home for two episodes of 

syncope over the past few days.  No clear illiciting factors. Patient is 

demented and was unable to provide any useful information, and is a poor 

historian. Denied any medical complaints. Discussed goals of care with family, 

and agreed for no further testing. Patient discharged back to his nursing home 

([facility]).





PHYSICAL EXAMINATION:


VITAL SIGNS: See below


General: NAD, lying comfortably in bed, pleasantly confused


HEENT: NC/AT, EOMI, PERRL


Lungs: CTA B/L


Heart: +S1S2, irregularly irregular


Abd: soft, NT, +BS, obese


Ext: no edema





DISCHARGE MEDICATIONS: Please see below.


 


ALLERGIES: Please see below.





LABORATORY DATA: Please see below.





ACTIVITY: [As tolerated].





DISPOSITION: [facility].





ITEMS TO FOLLOWUP ON ON OUTPATIENT:


1. Follow up [facility] PCP in 3-5 days.





DISCHARGE CONDITION: [Stable].





TIME SPENT ON DISCHARGE: 35 minutes.





Discharge Medications


Scheduled


Aspirin (Aspirin) 81 Mg Tab.chew, 81 MG PO DAILY, (Reported)


Atenolol (Atenolol) 25 Mg Tablet, 12.5 MG PO QHS, (Reported)


Brimonidine Tartrate (Brimonidine Tartrate) 0.15% 5ML Drops, 1 DROP OU BID, 

(Reported)


Carbamide Peroxide (Debrox) 15 Ml Drops, 5 DROP AU BID, (Reported)


   ordered 03/02/20 take for 4 days 


Folic Acid (Folic Acid) 1 Mg Tablet, 1 MG PO DAILY, (Reported)


Latanoprost (Xalatan) 0.005% 2.5ML Drops, 1 DROP OU QHS, (Reported)


Lisinopril (Lisinopril) 2.5 Mg Tablet, 2.5 MG PO QHS, (Reported)


Metformin HCl (Metformin HCl) 500 Mg Tablet, 500 MG PO BID, (Reported)


Methotrexate Sodium (Methotrexate) 2.5 Mg Tablet, 12.5 MG PO QWEEK, (Reported)


   [place] 


Omeprazole (Omeprazole) 20 Mg Tablet.dr, 20 MG PO DAILY, (Reported)


Quetiapine Fumarate (Seroquel) 25 Mg Tablet, 25 MG PO QHS, (Reported)


Rosuvastatin Calcium (Rosuvastatin Calcium) 40 Mg Tablet, 40 MG PO QHS, 

(Reported)


Sennosides/Docusate Sodium (Senna Plus Tablet) 1 Each Tablet, 1 TAB PO DAILY, 

(Reported)





Scheduled PRN


Acetaminophen (Acetaminophen) 325 Mg Tablet, 650 MG PO Q4H PRN for PAIN OR 

FEVER, (Reported)


Bisacodyl (Dulcolax) 10 Mg Supp.rect, 10 MG CT DAILY PRN for CONSTIPATION, 

(Reported)


Magnesium Hydroxide (Milk of Magnesia) 400 Mg/5 Ml Oral.susp, 2,400 MG PO DAILY 

PRN for CONSTIPATION, (Reported)


Menthol (Bengay) 5% Gel..gram., 1 DOSE TOP TID PRN for PAIN, (Reported)


   apply to shoulders and back 


Phenyleph/Pramoxin/Glycr/W.pet (Preparation H Cream) 26 Gm Cream..g., 1 DOSE TOP

 BID PRN for HEMORRHOIDS, (Reported)





Allergies


Coded Allergies:  


     donepezil (Verified  Adverse Reaction, Severe, sinus pause 2.2 seconds 

10/1/19 on telemetry, 10/2/19)


     timolol (Verified  Adverse Reaction, Intermediate, hypotension, 10/2/19)


   


   hypotension











PRISCILA ONEAL MD              Mar 17, 2020 07:35

## 2020-03-17 NOTE — REP
Portable chest x-ray:  Single view.

 

History:  Cough and congestion.

 

Comparison chest x-ray:  March 5, 2020.

 

Findings:  There are advanced arthropathy changes at the shoulders again noted.

The lungs are symmetrically aerated and clear.  The pleural angles are sharp.

Heart size is borderline unchanged.  No infiltrate is seen.  The aorta slightly

tortuous.  Pulmonary vasculature is not increased.

 

Impression:

 

No infiltrate seen.

 

 

Electronically Signed by

Carlitos Lindsey MD 03/17/2020 04:20 P

## 2020-03-20 ENCOUNTER — HOSPITAL ENCOUNTER (OUTPATIENT)
Dept: HOSPITAL 53 - SKLAB6 | Age: 85
End: 2020-03-20
Attending: INTERNAL MEDICINE
Payer: OTHER GOVERNMENT

## 2020-03-20 DIAGNOSIS — Z79.899: Primary | ICD-10-CM

## 2020-03-20 LAB
HCT VFR BLD AUTO: 29.4 % (ref 42–52)
HGB BLD-MCNC: 9.8 G/DL (ref 13.5–17.5)
MCH RBC QN AUTO: 30.9 PG (ref 27–33)
MCHC RBC AUTO-ENTMCNC: 33.3 G/DL (ref 32–36.5)
MCV RBC AUTO: 92.7 FL (ref 80–96)
PLATELET # BLD AUTO: 168 10^3/UL (ref 150–450)
RBC # BLD AUTO: 3.17 10^6/UL (ref 4.3–6.1)
WBC # BLD AUTO: 7.8 10^3/UL (ref 4–10)

## 2020-04-09 ENCOUNTER — HOSPITAL ENCOUNTER (OUTPATIENT)
Dept: HOSPITAL 53 - SKLAB6 | Age: 85
End: 2020-04-09
Attending: INTERNAL MEDICINE
Payer: OTHER GOVERNMENT

## 2020-04-09 DIAGNOSIS — E11.9: ICD-10-CM

## 2020-04-09 DIAGNOSIS — G30.1: Primary | ICD-10-CM

## 2020-04-09 DIAGNOSIS — I48.91: ICD-10-CM

## 2020-04-09 DIAGNOSIS — R55: ICD-10-CM

## 2020-04-09 DIAGNOSIS — E64.9: ICD-10-CM

## 2020-04-09 LAB
ALBUMIN SERPL BCG-MCNC: 3.4 GM/DL (ref 3.2–5.2)
ALT SERPL W P-5'-P-CCNC: 21 U/L (ref 12–78)
BILIRUB SERPL-MCNC: 0.7 MG/DL (ref 0.2–1)
BUN SERPL-MCNC: 24 MG/DL (ref 7–18)
CALCIUM SERPL-MCNC: 8.5 MG/DL (ref 8.8–10.2)
CHLORIDE SERPL-SCNC: 105 MEQ/L (ref 98–107)
CO2 SERPL-SCNC: 27 MEQ/L (ref 21–32)
CREAT SERPL-MCNC: 1.21 MG/DL (ref 0.7–1.3)
EST. AVERAGE GLUCOSE BLD GHB EST-MCNC: 128 MG/DL (ref 60–110)
GFR SERPL CREATININE-BSD FRML MDRD: > 60 ML/MIN/{1.73_M2} (ref 35–?)
GLUCOSE SERPL-MCNC: 136 MG/DL (ref 70–100)
HCT VFR BLD AUTO: 30.4 % (ref 42–52)
HGB BLD-MCNC: 10.1 G/DL (ref 13.5–17.5)
MCH RBC QN AUTO: 31 PG (ref 27–33)
MCHC RBC AUTO-ENTMCNC: 33.2 G/DL (ref 32–36.5)
MCV RBC AUTO: 93.3 FL (ref 80–96)
PLATELET # BLD AUTO: 202 10^3/UL (ref 150–450)
POTASSIUM SERPL-SCNC: 3.9 MEQ/L (ref 3.5–5.1)
PROT SERPL-MCNC: 6.9 GM/DL (ref 6.4–8.2)
RBC # BLD AUTO: 3.26 10^6/UL (ref 4.3–6.1)
SODIUM SERPL-SCNC: 139 MEQ/L (ref 136–145)
WBC # BLD AUTO: 8.7 10^3/UL (ref 4–10)

## 2020-04-13 ENCOUNTER — HOSPITAL ENCOUNTER (OUTPATIENT)
Dept: HOSPITAL 53 - SKLAB6 | Age: 85
End: 2020-04-13
Attending: INTERNAL MEDICINE
Payer: OTHER GOVERNMENT

## 2020-04-13 DIAGNOSIS — Z79.899: Primary | ICD-10-CM

## 2020-04-13 LAB
BUN SERPL-MCNC: 26 MG/DL (ref 7–18)
CALCIUM SERPL-MCNC: 8.5 MG/DL (ref 8.8–10.2)
CHLORIDE SERPL-SCNC: 102 MEQ/L (ref 98–107)
CO2 SERPL-SCNC: 28 MEQ/L (ref 21–32)
CREAT SERPL-MCNC: 1.33 MG/DL (ref 0.7–1.3)
GFR SERPL CREATININE-BSD FRML MDRD: 54.7 ML/MIN/{1.73_M2} (ref 35–?)
GLUCOSE SERPL-MCNC: 115 MG/DL (ref 70–100)
POTASSIUM SERPL-SCNC: 4 MEQ/L (ref 3.5–5.1)
SODIUM SERPL-SCNC: 140 MEQ/L (ref 136–145)

## 2020-04-15 ENCOUNTER — HOSPITAL ENCOUNTER (OUTPATIENT)
Dept: HOSPITAL 53 - M SFHCRHEU | Age: 85
End: 2020-04-15
Attending: INTERNAL MEDICINE
Payer: MEDICARE

## 2020-04-15 DIAGNOSIS — M05.79: Primary | ICD-10-CM

## 2020-04-15 LAB
ALBUMIN SERPL BCG-MCNC: 3.8 GM/DL (ref 3.2–5.2)
ALT SERPL W P-5'-P-CCNC: 24 U/L (ref 12–78)
BASOPHILS # BLD AUTO: 0 10^3/UL (ref 0–0.2)
BASOPHILS NFR BLD AUTO: 0.1 % (ref 0–1)
BILIRUB SERPL-MCNC: 1 MG/DL (ref 0.2–1)
BUN SERPL-MCNC: 27 MG/DL (ref 7–18)
CALCIUM SERPL-MCNC: 8.9 MG/DL (ref 8.8–10.2)
CHLORIDE SERPL-SCNC: 102 MEQ/L (ref 98–107)
CO2 SERPL-SCNC: 32 MEQ/L (ref 21–32)
CREAT SERPL-MCNC: 1.35 MG/DL (ref 0.7–1.3)
CRP SERPL-MCNC: 0.83 MG/DL (ref 0–0.3)
EOSINOPHIL # BLD AUTO: 0.1 10^3/UL (ref 0–0.5)
EOSINOPHIL NFR BLD AUTO: 0.8 % (ref 0–3)
ERYTHROCYTE [SEDIMENTATION RATE] IN BLOOD BY WESTERGREN METHOD: 60 MM/HR (ref 0–20)
GFR SERPL CREATININE-BSD FRML MDRD: 53.7 ML/MIN/{1.73_M2} (ref 35–?)
GLUCOSE SERPL-MCNC: 158 MG/DL (ref 70–100)
HCT VFR BLD AUTO: 31.7 % (ref 42–52)
HGB BLD-MCNC: 10 G/DL (ref 13.5–17.5)
LYMPHOCYTES # BLD AUTO: 1.2 10^3/UL (ref 1.5–5)
LYMPHOCYTES NFR BLD AUTO: 17.1 % (ref 24–44)
MCH RBC QN AUTO: 29.7 PG (ref 27–33)
MCHC RBC AUTO-ENTMCNC: 31.5 G/DL (ref 32–36.5)
MCV RBC AUTO: 94.1 FL (ref 80–96)
MONOCYTES # BLD AUTO: 0.3 10^3/UL (ref 0–0.8)
MONOCYTES NFR BLD AUTO: 4.2 % (ref 0–5)
NEUTROPHILS # BLD AUTO: 5.5 10^3/UL (ref 1.5–8.5)
NEUTROPHILS NFR BLD AUTO: 77.5 % (ref 36–66)
PLATELET # BLD AUTO: 208 10^3/UL (ref 150–450)
POTASSIUM SERPL-SCNC: 3.9 MEQ/L (ref 3.5–5.1)
PROT SERPL-MCNC: 7.5 GM/DL (ref 6.4–8.2)
RBC # BLD AUTO: 3.37 10^6/UL (ref 4.3–6.1)
SODIUM SERPL-SCNC: 138 MEQ/L (ref 136–145)
WBC # BLD AUTO: 7.2 10^3/UL (ref 4–10)

## 2020-04-15 PROCEDURE — 85025 COMPLETE CBC W/AUTO DIFF WBC: CPT

## 2020-04-15 PROCEDURE — 85652 RBC SED RATE AUTOMATED: CPT

## 2020-04-15 PROCEDURE — 86140 C-REACTIVE PROTEIN: CPT

## 2020-04-15 PROCEDURE — 36415 COLL VENOUS BLD VENIPUNCTURE: CPT

## 2020-04-15 PROCEDURE — 80053 COMPREHEN METABOLIC PANEL: CPT

## 2020-04-16 ENCOUNTER — HOSPITAL ENCOUNTER (OUTPATIENT)
Dept: HOSPITAL 53 - SKLAB6 | Age: 85
End: 2020-04-16
Attending: INTERNAL MEDICINE
Payer: MEDICARE

## 2020-04-16 DIAGNOSIS — Z79.899: ICD-10-CM

## 2020-04-16 DIAGNOSIS — D64.9: Primary | ICD-10-CM

## 2020-04-16 LAB
BUN SERPL-MCNC: 29 MG/DL (ref 7–18)
CALCIUM SERPL-MCNC: 9.3 MG/DL (ref 8.8–10.2)
CHLORIDE SERPL-SCNC: 103 MEQ/L (ref 98–107)
CO2 SERPL-SCNC: 33 MEQ/L (ref 21–32)
CREAT SERPL-MCNC: 1.23 MG/DL (ref 0.7–1.3)
GFR SERPL CREATININE-BSD FRML MDRD: 59.8 ML/MIN/{1.73_M2} (ref 35–?)
GLUCOSE SERPL-MCNC: 143 MG/DL (ref 70–100)
POTASSIUM SERPL-SCNC: 3.8 MEQ/L (ref 3.5–5.1)
SODIUM SERPL-SCNC: 139 MEQ/L (ref 136–145)

## 2020-04-20 ENCOUNTER — HOSPITAL ENCOUNTER (OUTPATIENT)
Dept: HOSPITAL 53 - SKLAB6 | Age: 85
End: 2020-04-20
Attending: INTERNAL MEDICINE
Payer: OTHER GOVERNMENT

## 2020-04-20 DIAGNOSIS — Z79.899: ICD-10-CM

## 2020-04-20 DIAGNOSIS — E78.5: Primary | ICD-10-CM

## 2020-04-20 DIAGNOSIS — D64.9: ICD-10-CM

## 2020-04-20 DIAGNOSIS — E11.9: ICD-10-CM

## 2020-04-20 LAB
BUN SERPL-MCNC: 35 MG/DL (ref 7–18)
CALCIUM SERPL-MCNC: 9.2 MG/DL (ref 8.8–10.2)
CHLORIDE SERPL-SCNC: 102 MEQ/L (ref 98–107)
CO2 SERPL-SCNC: 32 MEQ/L (ref 21–32)
CREAT SERPL-MCNC: 1.27 MG/DL (ref 0.7–1.3)
FERRITIN SERPL-MCNC: 278 NG/ML (ref 26–388)
FOLATE SERPL-MCNC: > 24 NG/ML (ref 5.4–?)
GFR SERPL CREATININE-BSD FRML MDRD: 57.7 ML/MIN/{1.73_M2} (ref 35–?)
GLUCOSE SERPL-MCNC: 115 MG/DL (ref 70–100)
IRON SATN MFR SERPL: 17.2 % (ref 19.7–50)
IRON SERPL-MCNC: 48 UG/DL (ref 65–175)
POTASSIUM SERPL-SCNC: 3.5 MEQ/L (ref 3.5–5.1)
SODIUM SERPL-SCNC: 139 MEQ/L (ref 136–145)
TIBC SERPL-MCNC: 279 UG/DL (ref 250–450)
VIT B12 SERPL-MCNC: 300 PG/ML (ref 247–911)

## 2020-04-21 ENCOUNTER — HOSPITAL ENCOUNTER (OUTPATIENT)
Dept: HOSPITAL 53 - M INFU | Age: 85
Discharge: HOME | End: 2020-04-21
Attending: INTERNAL MEDICINE
Payer: MEDICARE

## 2020-04-21 VITALS — WEIGHT: 213.63 LBS | BODY MASS INDEX: 29.91 KG/M2 | HEIGHT: 71 IN

## 2020-04-21 VITALS — DIASTOLIC BLOOD PRESSURE: 54 MMHG | SYSTOLIC BLOOD PRESSURE: 98 MMHG

## 2020-04-21 VITALS — SYSTOLIC BLOOD PRESSURE: 105 MMHG | DIASTOLIC BLOOD PRESSURE: 66 MMHG

## 2020-04-21 DIAGNOSIS — Z79.82: ICD-10-CM

## 2020-04-21 DIAGNOSIS — E11.9: ICD-10-CM

## 2020-04-21 DIAGNOSIS — Z79.899: ICD-10-CM

## 2020-04-21 DIAGNOSIS — Z11.59: ICD-10-CM

## 2020-04-21 DIAGNOSIS — I10: ICD-10-CM

## 2020-04-21 DIAGNOSIS — M05.79: Primary | ICD-10-CM

## 2020-04-21 PROCEDURE — 87340 HEPATITIS B SURFACE AG IA: CPT

## 2020-04-21 PROCEDURE — 96365 THER/PROPH/DIAG IV INF INIT: CPT

## 2020-05-14 ENCOUNTER — HOSPITAL ENCOUNTER (OUTPATIENT)
Dept: HOSPITAL 53 - SKLAB6 | Age: 85
End: 2020-05-14
Attending: INTERNAL MEDICINE
Payer: OTHER GOVERNMENT

## 2020-05-14 DIAGNOSIS — F03.90: ICD-10-CM

## 2020-05-14 DIAGNOSIS — Z79.899: ICD-10-CM

## 2020-05-14 DIAGNOSIS — M05.79: Primary | ICD-10-CM

## 2020-05-14 LAB
HCT VFR BLD AUTO: 33.5 % (ref 42–52)
HGB BLD-MCNC: 10.7 G/DL (ref 13.5–17.5)
MCH RBC QN AUTO: 29.2 PG (ref 27–33)
MCHC RBC AUTO-ENTMCNC: 31.9 G/DL (ref 32–36.5)
MCV RBC AUTO: 91.5 FL (ref 80–96)
PLATELET # BLD AUTO: 204 10^3/UL (ref 150–450)
RBC # BLD AUTO: 3.66 10^6/UL (ref 4.3–6.1)
WBC # BLD AUTO: 8.7 10^3/UL (ref 4–10)

## 2020-06-11 ENCOUNTER — HOSPITAL ENCOUNTER (OUTPATIENT)
Dept: HOSPITAL 53 - SKLAB6 | Age: 85
End: 2020-06-11
Attending: INTERNAL MEDICINE
Payer: MEDICARE

## 2020-06-11 DIAGNOSIS — Z79.899: Primary | ICD-10-CM

## 2020-06-11 LAB
ALBUMIN SERPL BCG-MCNC: 3.7 GM/DL (ref 3.2–5.2)
ALT SERPL W P-5'-P-CCNC: 40 U/L (ref 12–78)
BILIRUB SERPL-MCNC: 0.5 MG/DL (ref 0.2–1)
BUN SERPL-MCNC: 24 MG/DL (ref 7–18)
CALCIUM SERPL-MCNC: 8.6 MG/DL (ref 8.8–10.2)
CHLORIDE SERPL-SCNC: 103 MEQ/L (ref 98–107)
CO2 SERPL-SCNC: 32 MEQ/L (ref 21–32)
CREAT SERPL-MCNC: 1.2 MG/DL (ref 0.7–1.3)
GFR SERPL CREATININE-BSD FRML MDRD: > 60 ML/MIN/{1.73_M2} (ref 35–?)
GLUCOSE SERPL-MCNC: 98 MG/DL (ref 70–100)
HCT VFR BLD AUTO: 33.3 % (ref 42–52)
HGB BLD-MCNC: 10.8 G/DL (ref 13.5–17.5)
MCH RBC QN AUTO: 29.5 PG (ref 27–33)
MCHC RBC AUTO-ENTMCNC: 32.4 G/DL (ref 32–36.5)
MCV RBC AUTO: 91 FL (ref 80–96)
PLATELET # BLD AUTO: 157 10^3/UL (ref 150–450)
POTASSIUM SERPL-SCNC: 3.7 MEQ/L (ref 3.5–5.1)
PROT SERPL-MCNC: 7.6 GM/DL (ref 6.4–8.2)
RBC # BLD AUTO: 3.66 10^6/UL (ref 4.3–6.1)
SODIUM SERPL-SCNC: 140 MEQ/L (ref 136–145)
WBC # BLD AUTO: 7.6 10^3/UL (ref 4–10)

## 2020-06-16 ENCOUNTER — HOSPITAL ENCOUNTER (OUTPATIENT)
Dept: HOSPITAL 53 - SKLAB6 | Age: 85
End: 2020-06-16
Attending: INTERNAL MEDICINE
Payer: OTHER GOVERNMENT

## 2020-06-16 ENCOUNTER — HOSPITAL ENCOUNTER (OUTPATIENT)
Dept: HOSPITAL 53 - M INFU | Age: 85
Discharge: HOME | End: 2020-06-16
Attending: INTERNAL MEDICINE
Payer: MEDICARE

## 2020-06-16 VITALS — HEIGHT: 70.5 IN | BODY MASS INDEX: 30.24 KG/M2 | WEIGHT: 213.63 LBS

## 2020-06-16 VITALS — DIASTOLIC BLOOD PRESSURE: 62 MMHG | SYSTOLIC BLOOD PRESSURE: 107 MMHG

## 2020-06-16 VITALS — DIASTOLIC BLOOD PRESSURE: 57 MMHG | SYSTOLIC BLOOD PRESSURE: 102 MMHG

## 2020-06-16 DIAGNOSIS — Z79.899: Primary | ICD-10-CM

## 2020-06-16 DIAGNOSIS — M05.79: Primary | ICD-10-CM

## 2020-06-16 LAB
BUN SERPL-MCNC: 29 MG/DL (ref 7–18)
CALCIUM SERPL-MCNC: 9 MG/DL (ref 8.8–10.2)
CHLORIDE SERPL-SCNC: 104 MEQ/L (ref 98–107)
CO2 SERPL-SCNC: 34 MEQ/L (ref 21–32)
CREAT SERPL-MCNC: 1.29 MG/DL (ref 0.7–1.3)
GFR SERPL CREATININE-BSD FRML MDRD: 56.6 ML/MIN/{1.73_M2} (ref 35–?)
GLUCOSE SERPL-MCNC: 129 MG/DL (ref 70–100)
POTASSIUM SERPL-SCNC: 3.6 MEQ/L (ref 3.5–5.1)
SODIUM SERPL-SCNC: 141 MEQ/L (ref 136–145)

## 2020-06-16 PROCEDURE — 36592 COLLECT BLOOD FROM PICC: CPT

## 2020-06-16 PROCEDURE — 96365 THER/PROPH/DIAG IV INF INIT: CPT

## 2020-08-11 ENCOUNTER — HOSPITAL ENCOUNTER (OUTPATIENT)
Dept: HOSPITAL 53 - M INFU | Age: 85
Discharge: HOME | End: 2020-08-11
Attending: INTERNAL MEDICINE
Payer: MEDICARE

## 2020-08-11 DIAGNOSIS — M05.79: Primary | ICD-10-CM

## 2020-08-11 PROCEDURE — 96365 THER/PROPH/DIAG IV INF INIT: CPT

## 2020-08-11 PROCEDURE — 87340 HEPATITIS B SURFACE AG IA: CPT

## 2020-08-13 ENCOUNTER — HOSPITAL ENCOUNTER (OUTPATIENT)
Dept: HOSPITAL 53 - SKLAB6 | Age: 85
End: 2020-08-13
Attending: INTERNAL MEDICINE
Payer: MEDICARE

## 2020-08-13 DIAGNOSIS — Z79.899: Primary | ICD-10-CM

## 2020-08-27 ENCOUNTER — HOSPITAL ENCOUNTER (OUTPATIENT)
Dept: HOSPITAL 53 - M RAD | Age: 85
End: 2020-08-27
Attending: NURSE PRACTITIONER
Payer: MEDICARE

## 2020-08-27 DIAGNOSIS — M19.042: ICD-10-CM

## 2020-08-27 DIAGNOSIS — M79.642: Primary | ICD-10-CM

## 2020-09-01 ENCOUNTER — HOSPITAL ENCOUNTER (OUTPATIENT)
Dept: HOSPITAL 53 - SKLAB6 | Age: 85
End: 2020-09-01
Attending: INTERNAL MEDICINE
Payer: MEDICARE

## 2020-09-01 DIAGNOSIS — Z20.9: Primary | ICD-10-CM

## 2020-09-01 LAB
HBV SURFACE AB SER-ACNC: NEGATIVE M[IU]/ML
HCV AB SER IA-ACNC: 0.3 INDEX (ref 0–0.8)
HIV 1+2 AB+HIV1 P24 AG SERPL QL IA: NEGATIVE

## 2020-09-06 NOTE — REP
LEFT HAND SERIES: 4-VIEWS 



HISTORY: Pain and swelling second finger. 



COMPARISON: Left hand radiographs are from 10/02/2019. 



FINDINGS: 

There is severe osteoarthritis at the first carpometacarpal articulation. 
Advanced osteoarthritis is seen at the second or index metacarpophalangeal 
joint, as well as the IP joint of the thumb. Moderate osteoarthritic changes are
noted at the PIP and DIP joints of the index and long fingers. There is no 
evidence of fracture. Vascular calcification is noted.   



IMPRESSION: 

Advanced osteoarthritis. No acute bony abnormality is appreciated.  

MTDD

## 2020-09-10 ENCOUNTER — HOSPITAL ENCOUNTER (OUTPATIENT)
Dept: HOSPITAL 53 - SKLAB6 | Age: 85
End: 2020-09-10
Attending: INTERNAL MEDICINE
Payer: MEDICARE

## 2020-09-10 DIAGNOSIS — I48.91: Primary | ICD-10-CM

## 2020-09-10 LAB
HCT VFR BLD AUTO: 32.7 % (ref 42–52)
HGB BLD-MCNC: 11 G/DL (ref 13.5–17.5)
MCH RBC QN AUTO: 31.2 PG (ref 27–33)
MCHC RBC AUTO-ENTMCNC: 33.6 G/DL (ref 32–36.5)
MCV RBC AUTO: 92.6 FL (ref 80–96)
PLATELET # BLD AUTO: 210 10^3/UL (ref 150–450)
RBC # BLD AUTO: 3.53 10^6/UL (ref 4.3–6.1)
WBC # BLD AUTO: 7.9 10^3/UL (ref 4–10)

## 2020-10-07 LAB
ALBUMIN SERPL BCG-MCNC: 4 GM/DL (ref 3.2–5.2)
ALT SERPL W P-5'-P-CCNC: 29 U/L (ref 12–78)
BILIRUB SERPL-MCNC: 0.6 MG/DL (ref 0.2–1)
BUN SERPL-MCNC: 27 MG/DL (ref 7–18)
CALCIUM SERPL-MCNC: 8.9 MG/DL (ref 8.8–10.2)
CHLORIDE SERPL-SCNC: 102 MEQ/L (ref 98–107)
CO2 SERPL-SCNC: 33 MEQ/L (ref 21–32)
CREAT SERPL-MCNC: 1.23 MG/DL (ref 0.7–1.3)
GFR SERPL CREATININE-BSD FRML MDRD: 59.7 ML/MIN/{1.73_M2} (ref 35–?)
GLUCOSE SERPL-MCNC: 101 MG/DL (ref 70–100)
POTASSIUM SERPL-SCNC: 3.5 MEQ/L (ref 3.5–5.1)
PROT SERPL-MCNC: 7.8 GM/DL (ref 6.4–8.2)
SODIUM SERPL-SCNC: 139 MEQ/L (ref 136–145)

## 2020-10-08 ENCOUNTER — HOSPITAL ENCOUNTER (OUTPATIENT)
Dept: HOSPITAL 53 - SKLAB6 | Age: 85
End: 2020-10-08
Attending: INTERNAL MEDICINE
Payer: MEDICARE

## 2020-10-08 DIAGNOSIS — E11.9: ICD-10-CM

## 2020-10-08 DIAGNOSIS — Z79.899: Primary | ICD-10-CM

## 2020-10-08 LAB
ALBUMIN SERPL BCG-MCNC: 3.7 GM/DL (ref 3.2–5.2)
ALT SERPL W P-5'-P-CCNC: 27 U/L (ref 12–78)
BILIRUB SERPL-MCNC: 0.5 MG/DL (ref 0.2–1)
BUN SERPL-MCNC: 23 MG/DL (ref 7–18)
CALCIUM SERPL-MCNC: 8.6 MG/DL (ref 8.8–10.2)
CHLORIDE SERPL-SCNC: 104 MEQ/L (ref 98–107)
CO2 SERPL-SCNC: 31 MEQ/L (ref 21–32)
CREAT SERPL-MCNC: 1.16 MG/DL (ref 0.7–1.3)
EST. AVERAGE GLUCOSE BLD GHB EST-MCNC: 114 MG/DL (ref 60–110)
GFR SERPL CREATININE-BSD FRML MDRD: > 60 ML/MIN/{1.73_M2} (ref 35–?)
GLUCOSE SERPL-MCNC: 97 MG/DL (ref 70–100)
POTASSIUM SERPL-SCNC: 3.7 MEQ/L (ref 3.5–5.1)
PROT SERPL-MCNC: 7.4 GM/DL (ref 6.4–8.2)
SODIUM SERPL-SCNC: 140 MEQ/L (ref 136–145)

## 2020-10-09 ENCOUNTER — HOSPITAL ENCOUNTER (OUTPATIENT)
Dept: HOSPITAL 53 - M INFU | Age: 85
Discharge: HOME | End: 2020-10-09
Attending: INTERNAL MEDICINE
Payer: MEDICARE

## 2020-10-09 VITALS — DIASTOLIC BLOOD PRESSURE: 54 MMHG | SYSTOLIC BLOOD PRESSURE: 99 MMHG

## 2020-10-09 VITALS — SYSTOLIC BLOOD PRESSURE: 99 MMHG | DIASTOLIC BLOOD PRESSURE: 54 MMHG

## 2020-10-09 VITALS — DIASTOLIC BLOOD PRESSURE: 67 MMHG | SYSTOLIC BLOOD PRESSURE: 100 MMHG

## 2020-10-09 VITALS — WEIGHT: 188.5 LBS | BODY MASS INDEX: 26.99 KG/M2 | HEIGHT: 70 IN

## 2020-10-09 DIAGNOSIS — M05.79: Primary | ICD-10-CM

## 2020-10-09 PROCEDURE — 96365 THER/PROPH/DIAG IV INF INIT: CPT

## 2020-10-09 RX ADMIN — SODIUM CHLORIDE ONE MLS/HR: 9 INJECTION, SOLUTION INTRAVENOUS at 10:28

## 2020-10-09 RX ADMIN — SODIUM CHLORIDE SCH MLS/HR: 9 INJECTION, SOLUTION INTRAVENOUS at 10:00

## 2020-11-13 LAB
FLUAV RNA UPPER RESP QL NAA+PROBE: NEGATIVE
FLUBV RNA UPPER RESP QL NAA+PROBE: NEGATIVE

## 2020-11-17 ENCOUNTER — HOSPITAL ENCOUNTER (OUTPATIENT)
Dept: HOSPITAL 53 - SKLAB6 | Age: 85
LOS: 13 days | End: 2020-11-30
Attending: INTERNAL MEDICINE

## 2020-11-17 DIAGNOSIS — Z20.828: Primary | ICD-10-CM

## 2020-11-25 ENCOUNTER — HOSPITAL ENCOUNTER (OUTPATIENT)
Dept: HOSPITAL 53 - SKLAB6 | Age: 85
End: 2020-11-25
Attending: INTERNAL MEDICINE

## 2020-11-25 DIAGNOSIS — Z20.828: Primary | ICD-10-CM

## 2020-12-02 ENCOUNTER — HOSPITAL ENCOUNTER (OUTPATIENT)
Dept: HOSPITAL 53 - SKLAB6 | Age: 85
End: 2020-12-02
Attending: INTERNAL MEDICINE

## 2020-12-02 DIAGNOSIS — Z20.828: Primary | ICD-10-CM

## 2020-12-02 LAB
FLUAV RNA UPPER RESP QL NAA+PROBE: NEGATIVE
FLUBV RNA UPPER RESP QL NAA+PROBE: NEGATIVE

## 2020-12-04 ENCOUNTER — HOSPITAL ENCOUNTER (OUTPATIENT)
Dept: HOSPITAL 53 - M INFU | Age: 85
Discharge: HOME | End: 2020-12-04
Attending: INTERNAL MEDICINE
Payer: MEDICARE

## 2020-12-04 VITALS — DIASTOLIC BLOOD PRESSURE: 57 MMHG | SYSTOLIC BLOOD PRESSURE: 100 MMHG

## 2020-12-04 VITALS — HEIGHT: 70 IN | WEIGHT: 188.5 LBS | BODY MASS INDEX: 26.99 KG/M2

## 2020-12-04 VITALS — SYSTOLIC BLOOD PRESSURE: 104 MMHG | DIASTOLIC BLOOD PRESSURE: 56 MMHG

## 2020-12-04 DIAGNOSIS — M05.79: Primary | ICD-10-CM

## 2020-12-04 DIAGNOSIS — Z88.8: ICD-10-CM

## 2020-12-04 PROCEDURE — 96365 THER/PROPH/DIAG IV INF INIT: CPT

## 2020-12-07 ENCOUNTER — HOSPITAL ENCOUNTER (OUTPATIENT)
Dept: HOSPITAL 53 - M RAD | Age: 85
End: 2020-12-07
Attending: NURSE PRACTITIONER
Payer: MEDICARE

## 2020-12-07 DIAGNOSIS — Z79.01: Primary | ICD-10-CM

## 2020-12-07 DIAGNOSIS — Z91.81: ICD-10-CM

## 2020-12-07 NOTE — REP
INDICATION:

S/P FALL, ON ELIQUIS.



COMPARISON:

Comparison head CT study July 10, 2019..



TECHNIQUE:

Helical scanning is acquired. 5 mm axial images were reformatted.  Coronal MPR images

were generated.



FINDINGS:

Bone window settings demonstrate an intact bony calvarium.  There is no evidence of

skull fracture or incidental bony calvarial lesion.  The visualized paranasal sinuses

appear clear.  No intraorbital abnormality is seen.  On soft tissue window setting

images; the lateral, third, and fourth ventricles are normal in size and position.

Gray-white differentiation pattern is normal above and below the tentorium.  There are

is no evidence of intracranial hemorrhage.  No mass, edema, infarction, or midline

shift is seen.  No extra-axial fluid collection is appreciated.



There is mild-to-moderate generalized volume loss and small vessel changes are noted.

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  Vascular calcification is

noted.  Findings are unchanged5 from the July 10, 2019 study.



IMPRESSION:

Vascular calcification, small vessel changes, moderate generalized volume loss.  No

acute intracranial abnormality..





<Electronically signed by Silvio Lindsey > 12/07/20 [id number]

## 2020-12-09 ENCOUNTER — HOSPITAL ENCOUNTER (OUTPATIENT)
Dept: HOSPITAL 53 - SKLAB6 | Age: 85
End: 2020-12-09
Attending: INTERNAL MEDICINE

## 2020-12-09 DIAGNOSIS — Z20.828: Primary | ICD-10-CM

## 2020-12-10 ENCOUNTER — HOSPITAL ENCOUNTER (OUTPATIENT)
Dept: HOSPITAL 53 - SKLAB6 | Age: 85
End: 2020-12-10
Attending: INTERNAL MEDICINE
Payer: MEDICARE

## 2020-12-10 DIAGNOSIS — Z51.81: Primary | ICD-10-CM

## 2020-12-10 DIAGNOSIS — Z79.899: ICD-10-CM

## 2020-12-10 LAB
ALBUMIN SERPL BCG-MCNC: 3.1 GM/DL (ref 3.2–5.2)
ALT SERPL W P-5'-P-CCNC: 22 U/L (ref 12–78)
BILIRUB SERPL-MCNC: 0.6 MG/DL (ref 0.2–1)
BUN SERPL-MCNC: 24 MG/DL (ref 7–18)
CALCIUM SERPL-MCNC: 8.5 MG/DL (ref 8.8–10.2)
CHLORIDE SERPL-SCNC: 107 MEQ/L (ref 98–107)
CO2 SERPL-SCNC: 31 MEQ/L (ref 21–32)
CREAT SERPL-MCNC: 1.24 MG/DL (ref 0.7–1.3)
GFR SERPL CREATININE-BSD FRML MDRD: 59.1 ML/MIN/{1.73_M2} (ref 35–?)
GLUCOSE SERPL-MCNC: 106 MG/DL (ref 70–100)
HCT VFR BLD AUTO: 29.9 % (ref 42–52)
HGB BLD-MCNC: 9.6 G/DL (ref 13.5–17.5)
MCH RBC QN AUTO: 30 PG (ref 27–33)
MCHC RBC AUTO-ENTMCNC: 32.1 G/DL (ref 32–36.5)
MCV RBC AUTO: 93.4 FL (ref 80–96)
PLATELET # BLD AUTO: 125 10^3/UL (ref 150–450)
POTASSIUM SERPL-SCNC: 3.6 MEQ/L (ref 3.5–5.1)
PROT SERPL-MCNC: 6.3 GM/DL (ref 6.4–8.2)
RBC # BLD AUTO: 3.2 10^6/UL (ref 4.3–6.1)
SODIUM SERPL-SCNC: 142 MEQ/L (ref 136–145)
WBC # BLD AUTO: 6.5 10^3/UL (ref 4–10)

## 2020-12-22 ENCOUNTER — HOSPITAL ENCOUNTER (OUTPATIENT)
Dept: HOSPITAL 53 - SKLAB6 | Age: 85
End: 2020-12-22
Attending: INTERNAL MEDICINE
Payer: MEDICARE

## 2020-12-22 DIAGNOSIS — I95.9: Primary | ICD-10-CM

## 2020-12-22 LAB
BUN SERPL-MCNC: 30 MG/DL (ref 7–18)
CALCIUM SERPL-MCNC: 9.1 MG/DL (ref 8.8–10.2)
CHLORIDE SERPL-SCNC: 104 MEQ/L (ref 98–107)
CO2 SERPL-SCNC: 31 MEQ/L (ref 21–32)
CREAT SERPL-MCNC: 1.39 MG/DL (ref 0.7–1.3)
GFR SERPL CREATININE-BSD FRML MDRD: 51.8 ML/MIN/{1.73_M2} (ref 35–?)
GLUCOSE SERPL-MCNC: 99 MG/DL (ref 70–100)
POTASSIUM SERPL-SCNC: 3.5 MEQ/L (ref 3.5–5.1)
SODIUM SERPL-SCNC: 141 MEQ/L (ref 136–145)

## 2020-12-23 ENCOUNTER — HOSPITAL ENCOUNTER (OUTPATIENT)
Dept: HOSPITAL 53 - SKLAB6 | Age: 85
End: 2020-12-23
Attending: INTERNAL MEDICINE
Payer: MEDICARE

## 2020-12-23 DIAGNOSIS — Z20.828: Primary | ICD-10-CM

## 2020-12-30 ENCOUNTER — HOSPITAL ENCOUNTER (OUTPATIENT)
Dept: HOSPITAL 53 - SKLAB6 | Age: 85
End: 2020-12-30
Attending: INTERNAL MEDICINE
Payer: MEDICARE

## 2020-12-30 DIAGNOSIS — Z20.828: Primary | ICD-10-CM

## 2021-01-06 ENCOUNTER — HOSPITAL ENCOUNTER (OUTPATIENT)
Dept: HOSPITAL 53 - SKLAB6 | Age: 86
End: 2021-01-06
Attending: INTERNAL MEDICINE

## 2021-01-06 DIAGNOSIS — Z11.52: Primary | ICD-10-CM

## 2021-01-13 ENCOUNTER — HOSPITAL ENCOUNTER (OUTPATIENT)
Dept: HOSPITAL 53 - SKLAB6 | Age: 86
End: 2021-01-13
Attending: INTERNAL MEDICINE
Payer: MEDICARE

## 2021-01-13 DIAGNOSIS — Z20.822: Primary | ICD-10-CM

## 2021-01-20 ENCOUNTER — HOSPITAL ENCOUNTER (OUTPATIENT)
Dept: HOSPITAL 53 - SKLAB6 | Age: 86
End: 2021-01-20
Attending: INTERNAL MEDICINE

## 2021-01-20 DIAGNOSIS — Z20.822: Primary | ICD-10-CM

## 2021-01-27 ENCOUNTER — HOSPITAL ENCOUNTER (OUTPATIENT)
Dept: HOSPITAL 53 - SKLAB6 | Age: 86
End: 2021-01-27
Attending: INTERNAL MEDICINE
Payer: MEDICARE

## 2021-01-27 DIAGNOSIS — Z20.822: Primary | ICD-10-CM

## 2021-01-27 LAB
FLUAV RNA UPPER RESP QL NAA+PROBE: NEGATIVE
FLUBV RNA UPPER RESP QL NAA+PROBE: NEGATIVE

## 2021-01-27 PROCEDURE — 87502 INFLUENZA DNA AMP PROBE: CPT

## 2021-01-29 ENCOUNTER — HOSPITAL ENCOUNTER (OUTPATIENT)
Dept: HOSPITAL 53 - M INFU | Age: 86
Discharge: HOME | End: 2021-01-29
Attending: INTERNAL MEDICINE
Payer: MEDICARE

## 2021-01-29 VITALS — WEIGHT: 188.5 LBS | HEIGHT: 70 IN | BODY MASS INDEX: 26.99 KG/M2

## 2021-01-29 VITALS — SYSTOLIC BLOOD PRESSURE: 100 MMHG | DIASTOLIC BLOOD PRESSURE: 50 MMHG

## 2021-01-29 VITALS — SYSTOLIC BLOOD PRESSURE: 121 MMHG | DIASTOLIC BLOOD PRESSURE: 61 MMHG

## 2021-01-29 VITALS — DIASTOLIC BLOOD PRESSURE: 50 MMHG | SYSTOLIC BLOOD PRESSURE: 100 MMHG

## 2021-01-29 DIAGNOSIS — Z88.8: ICD-10-CM

## 2021-01-29 DIAGNOSIS — M05.79: Primary | ICD-10-CM

## 2021-02-03 ENCOUNTER — HOSPITAL ENCOUNTER (OUTPATIENT)
Dept: HOSPITAL 53 - SKLAB6 | Age: 86
End: 2021-02-03
Attending: INTERNAL MEDICINE

## 2021-02-03 DIAGNOSIS — Z20.822: Primary | ICD-10-CM

## 2021-02-10 ENCOUNTER — HOSPITAL ENCOUNTER (OUTPATIENT)
Dept: HOSPITAL 53 - SKLAB6 | Age: 86
End: 2021-02-10
Attending: INTERNAL MEDICINE

## 2021-02-10 DIAGNOSIS — Z20.822: Primary | ICD-10-CM

## 2021-02-11 ENCOUNTER — HOSPITAL ENCOUNTER (OUTPATIENT)
Dept: HOSPITAL 53 - SKLAB6 | Age: 86
End: 2021-02-11
Attending: INTERNAL MEDICINE
Payer: MEDICARE

## 2021-02-11 DIAGNOSIS — Z79.899: Primary | ICD-10-CM

## 2021-02-11 LAB
ALBUMIN SERPL BCG-MCNC: 3.5 GM/DL (ref 3.2–5.2)
ALT SERPL W P-5'-P-CCNC: 27 U/L (ref 12–78)
BILIRUB SERPL-MCNC: 0.5 MG/DL (ref 0.2–1)
BUN SERPL-MCNC: 25 MG/DL (ref 7–18)
CALCIUM SERPL-MCNC: 8.6 MG/DL (ref 8.8–10.2)
CHLORIDE SERPL-SCNC: 108 MEQ/L (ref 98–107)
CO2 SERPL-SCNC: 29 MEQ/L (ref 21–32)
CREAT SERPL-MCNC: 1.2 MG/DL (ref 0.7–1.3)
GFR SERPL CREATININE-BSD FRML MDRD: > 60 ML/MIN/{1.73_M2} (ref 35–?)
GLUCOSE SERPL-MCNC: 94 MG/DL (ref 70–100)
POTASSIUM SERPL-SCNC: 4.1 MEQ/L (ref 3.5–5.1)
PROT SERPL-MCNC: 6.8 GM/DL (ref 6.4–8.2)
SODIUM SERPL-SCNC: 143 MEQ/L (ref 136–145)

## 2021-02-17 ENCOUNTER — HOSPITAL ENCOUNTER (OUTPATIENT)
Dept: HOSPITAL 53 - SKLAB6 | Age: 86
End: 2021-02-17
Attending: INTERNAL MEDICINE

## 2021-02-17 DIAGNOSIS — Z20.822: Primary | ICD-10-CM

## 2021-02-24 ENCOUNTER — HOSPITAL ENCOUNTER (OUTPATIENT)
Dept: HOSPITAL 53 - SKLAB6 | Age: 86
End: 2021-02-24
Attending: INTERNAL MEDICINE

## 2021-02-24 DIAGNOSIS — Z20.822: Primary | ICD-10-CM

## 2021-03-10 ENCOUNTER — HOSPITAL ENCOUNTER (OUTPATIENT)
Dept: HOSPITAL 53 - SKLAB6 | Age: 86
End: 2021-03-10
Attending: INTERNAL MEDICINE

## 2021-03-10 DIAGNOSIS — Z20.822: Primary | ICD-10-CM

## 2021-03-11 ENCOUNTER — HOSPITAL ENCOUNTER (OUTPATIENT)
Dept: HOSPITAL 53 - SKLAB6 | Age: 86
End: 2021-03-11
Attending: INTERNAL MEDICINE
Payer: MEDICARE

## 2021-03-11 DIAGNOSIS — I48.91: Primary | ICD-10-CM

## 2021-03-11 LAB
HCT VFR BLD AUTO: 31.2 % (ref 42–52)
HGB BLD-MCNC: 9.9 G/DL (ref 13.5–17.5)
MCH RBC QN AUTO: 29.7 PG (ref 27–33)
MCHC RBC AUTO-ENTMCNC: 31.7 G/DL (ref 32–36.5)
MCV RBC AUTO: 93.7 FL (ref 80–96)
PLATELET # BLD AUTO: 137 10^3/UL (ref 150–450)
RBC # BLD AUTO: 3.33 10^6/UL (ref 4.3–6.1)
WBC # BLD AUTO: 6.5 10^3/UL (ref 4–10)

## 2021-03-17 ENCOUNTER — HOSPITAL ENCOUNTER (OUTPATIENT)
Dept: HOSPITAL 53 - SKLAB6 | Age: 86
End: 2021-03-17
Attending: INTERNAL MEDICINE

## 2021-03-17 DIAGNOSIS — Z20.822: Primary | ICD-10-CM

## 2021-03-26 ENCOUNTER — HOSPITAL ENCOUNTER (OUTPATIENT)
Dept: HOSPITAL 53 - M INFU | Age: 86
Discharge: HOME | End: 2021-03-26
Attending: INTERNAL MEDICINE
Payer: MEDICARE

## 2021-03-26 VITALS — SYSTOLIC BLOOD PRESSURE: 118 MMHG | DIASTOLIC BLOOD PRESSURE: 59 MMHG

## 2021-03-26 VITALS — DIASTOLIC BLOOD PRESSURE: 59 MMHG | SYSTOLIC BLOOD PRESSURE: 118 MMHG

## 2021-03-26 VITALS — DIASTOLIC BLOOD PRESSURE: 57 MMHG | SYSTOLIC BLOOD PRESSURE: 115 MMHG

## 2021-03-26 VITALS — WEIGHT: 188.5 LBS | HEIGHT: 70 IN | BODY MASS INDEX: 26.99 KG/M2

## 2021-03-26 DIAGNOSIS — Z88.8: ICD-10-CM

## 2021-03-26 DIAGNOSIS — M05.79: Primary | ICD-10-CM

## 2021-03-26 PROCEDURE — 96365 THER/PROPH/DIAG IV INF INIT: CPT

## 2021-04-02 ENCOUNTER — HOSPITAL ENCOUNTER (OUTPATIENT)
Dept: HOSPITAL 53 - SKLAB6 | Age: 86
End: 2021-04-02
Attending: INTERNAL MEDICINE

## 2021-04-02 DIAGNOSIS — Z20.822: Primary | ICD-10-CM

## 2021-04-08 ENCOUNTER — HOSPITAL ENCOUNTER (OUTPATIENT)
Dept: HOSPITAL 53 - SKLAB6 | Age: 86
End: 2021-04-08
Attending: INTERNAL MEDICINE
Payer: MEDICARE

## 2021-04-08 DIAGNOSIS — E11.9: Primary | ICD-10-CM

## 2021-04-08 LAB
ALBUMIN SERPL BCG-MCNC: 3.6 GM/DL (ref 3.2–5.2)
ALT SERPL W P-5'-P-CCNC: 21 U/L (ref 12–78)
BILIRUB SERPL-MCNC: 0.7 MG/DL (ref 0.2–1)
BUN SERPL-MCNC: 26 MG/DL (ref 7–18)
CALCIUM SERPL-MCNC: 8.6 MG/DL (ref 8.8–10.2)
CHLORIDE SERPL-SCNC: 109 MEQ/L (ref 98–107)
CO2 SERPL-SCNC: 27 MEQ/L (ref 21–32)
CREAT SERPL-MCNC: 1.09 MG/DL (ref 0.7–1.3)
EST. AVERAGE GLUCOSE BLD GHB EST-MCNC: 120 MG/DL (ref 60–110)
GFR SERPL CREATININE-BSD FRML MDRD: > 60 ML/MIN/{1.73_M2} (ref 35–?)
GLUCOSE SERPL-MCNC: 84 MG/DL (ref 70–100)
POTASSIUM SERPL-SCNC: 4.1 MEQ/L (ref 3.5–5.1)
PROT SERPL-MCNC: 7 GM/DL (ref 6.4–8.2)
SODIUM SERPL-SCNC: 141 MEQ/L (ref 136–145)

## 2021-05-13 ENCOUNTER — HOSPITAL ENCOUNTER (INPATIENT)
Dept: HOSPITAL 53 - M ED | Age: 86
LOS: 1 days | Discharge: NURSING, REHAB OR LONG-TERM CARE | DRG: 951 | End: 2021-05-14
Attending: INTERNAL MEDICINE | Admitting: INTERNAL MEDICINE
Payer: MEDICARE

## 2021-05-13 VITALS — HEIGHT: 67 IN | BODY MASS INDEX: 31.58 KG/M2 | WEIGHT: 201.22 LBS

## 2021-05-13 VITALS — SYSTOLIC BLOOD PRESSURE: 139 MMHG | DIASTOLIC BLOOD PRESSURE: 81 MMHG

## 2021-05-13 DIAGNOSIS — Z79.899: ICD-10-CM

## 2021-05-13 DIAGNOSIS — R29.6: ICD-10-CM

## 2021-05-13 DIAGNOSIS — F03.90: ICD-10-CM

## 2021-05-13 DIAGNOSIS — K21.9: ICD-10-CM

## 2021-05-13 DIAGNOSIS — E11.9: ICD-10-CM

## 2021-05-13 DIAGNOSIS — Z87.820: ICD-10-CM

## 2021-05-13 DIAGNOSIS — I48.20: ICD-10-CM

## 2021-05-13 DIAGNOSIS — Z79.01: ICD-10-CM

## 2021-05-13 DIAGNOSIS — Z51.5: Primary | ICD-10-CM

## 2021-05-13 DIAGNOSIS — I10: ICD-10-CM

## 2021-05-13 DIAGNOSIS — S00.83XA: ICD-10-CM

## 2021-05-13 DIAGNOSIS — H40.9: ICD-10-CM

## 2021-05-13 DIAGNOSIS — Z66: ICD-10-CM

## 2021-05-13 DIAGNOSIS — Z88.8: ICD-10-CM

## 2021-05-13 DIAGNOSIS — S00.03XA: ICD-10-CM

## 2021-05-13 DIAGNOSIS — G47.33: ICD-10-CM

## 2021-05-13 DIAGNOSIS — M06.9: ICD-10-CM

## 2021-05-13 LAB
ALBUMIN SERPL BCG-MCNC: 3.7 GM/DL (ref 3.2–5.2)
ALT SERPL W P-5'-P-CCNC: 27 U/L (ref 12–78)
APTT BLD: 49.8 SECONDS (ref 24.2–38.5)
BASOPHILS # BLD AUTO: 0 10^3/UL (ref 0–0.2)
BASOPHILS NFR BLD AUTO: 0.3 % (ref 0–1)
BILIRUB SERPL-MCNC: 0.6 MG/DL (ref 0.2–1)
BUN SERPL-MCNC: 23 MG/DL (ref 7–18)
CALCIUM SERPL-MCNC: 8.8 MG/DL (ref 8.8–10.2)
CHLORIDE SERPL-SCNC: 109 MEQ/L (ref 98–107)
CO2 SERPL-SCNC: 29 MEQ/L (ref 21–32)
CREAT SERPL-MCNC: 1.17 MG/DL (ref 0.7–1.3)
EOSINOPHIL # BLD AUTO: 0.1 10^3/UL (ref 0–0.5)
EOSINOPHIL NFR BLD AUTO: 1.3 % (ref 0–3)
GFR SERPL CREATININE-BSD FRML MDRD: > 60 ML/MIN/{1.73_M2} (ref 35–?)
GLUCOSE SERPL-MCNC: 93 MG/DL (ref 70–100)
HCT VFR BLD AUTO: 33.8 % (ref 42–52)
HGB BLD-MCNC: 10.8 G/DL (ref 13.5–17.5)
INR PPP: 1.25
LYMPHOCYTES # BLD AUTO: 1.9 10^3/UL (ref 1.5–5)
LYMPHOCYTES NFR BLD AUTO: 31.7 % (ref 24–44)
MCH RBC QN AUTO: 30.1 PG (ref 27–33)
MCHC RBC AUTO-ENTMCNC: 32 G/DL (ref 32–36.5)
MCV RBC AUTO: 94.2 FL (ref 80–96)
MONOCYTES # BLD AUTO: 0.4 10^3/UL (ref 0–0.8)
MONOCYTES NFR BLD AUTO: 6.9 % (ref 2–8)
NEUTROPHILS # BLD AUTO: 3.6 10^3/UL (ref 1.5–8.5)
NEUTROPHILS NFR BLD AUTO: 59.5 % (ref 36–66)
PLATELET # BLD AUTO: 147 10^3/UL (ref 150–450)
POTASSIUM SERPL-SCNC: 4.4 MEQ/L (ref 3.5–5.1)
PROT SERPL-MCNC: 7.4 GM/DL (ref 6.4–8.2)
PROTHROMBIN TIME: 16 SECONDS (ref 12.5–14.3)
RBC # BLD AUTO: 3.59 10^6/UL (ref 4.3–6.1)
SODIUM SERPL-SCNC: 142 MEQ/L (ref 136–145)
WBC # BLD AUTO: 6.1 10^3/UL (ref 4–10)

## 2021-05-13 RX ADMIN — MORPHINE SULFATE PRN MG: 2 INJECTION, SOLUTION INTRAMUSCULAR; INTRAVENOUS at 18:25

## 2021-05-13 RX ADMIN — BRIMONIDINE TARTRATE SCH DROP: 1.5 SOLUTION OPHTHALMIC at 20:51

## 2021-05-13 RX ADMIN — BRIMONIDINE TARTRATE SCH DROP: 1.5 SOLUTION OPHTHALMIC at 09:00

## 2021-05-13 RX ADMIN — MORPHINE SULFATE PRN MG: 2 INJECTION, SOLUTION INTRAMUSCULAR; INTRAVENOUS at 12:45

## 2021-05-13 NOTE — REPVR
PROCEDURE INFORMATION: 

Exam: CT Cervical Spine Without Contrast 

Exam date and time: 5/13/2021 8:23 AM 

Age: 85 years old 

Clinical indication: Injury or trauma; Fall; Blunt trauma; Additional info: 

Fall on eliquis 



TECHNIQUE: 

Imaging protocol: Computed tomography images of the cervical spine without 

contrast. 

Radiation optimization: All CT scans at this facility use at least one of these 

dose optimization techniques: automated exposure control; mA and/or kV 

adjustment per patient size (includes targeted exams where dose is matched to 

clinical indication); or iterative reconstruction. 



COMPARISON: 

No relevant prior studies available. 



FINDINGS: 

Bones/joints: There are diffuse enthesopathic changes consistent with benign 

diffuse idiopathic skeletal hyperostosis (DISH). There is anterior bony 

bridging from C4 through T2. There is moderate facet arthropathy with bilateral 

foraminal compromise throughout the mid cervical levels from C3 through C6. 

There is no evidence of fracture. 

Discs/Spinal canal/Neural foramina: See "Bones/joints" finding. 



Thyroid: The thyroid gland is normal. 

Lungs: The visualized portions of the lung apices are normal. 

Vasculature: The vasculature demonstrates diffuse moderate atherosclerotic 

calcification. 

Soft tissues: Unremarkable. 



IMPRESSION: 

1. There are diffuse enthesopathic changes consistent with benign diffuse 

idiopathic skeletal hyperostosis (DISH). There is anterior bony bridging from 

C4 through T2. There is moderate facet arthropathy with bilateral foraminal 

compromise throughout the mid cervical levels from C3 through C6. 

2. There is no evidence of fracture. 



Electronically signed by: Jimi Andino On 05/13/2021  08:44:07 AM

## 2021-05-13 NOTE — HPEPDOC
[facility] Medical History & Physical


Date of Admission


May 13, 2021


Date of Service:  May 13, 2021


Attending Physician:  SHANT PRIDE MD





History and Physical


CHIEF COMPLAINT: Mechanical fall with head trauma





HISTORY OF PRESENT ILLNESS:


86 yo man with a history of advanced dementia, chronic Afib on eliquis and a 

history of frequent falls, recent of [facility] who was brought after being found down 

early this AM with evidence of head trauma. On arrival he was minimally 

responsive but otherwise hemodynamically stable, afebrile and breathing 

comfortably on room air. Initial workup included a CT head that showed 3cm 

hematoma of the R forehead without evidence of a fracture, as well as a 6mm oval

hypointensity of a L parietal gyrus c/w at leas ta cortical contusion while CT 

of the C-spine did not reveal an acute fracture but had diffuse anthesopathic 

changes c/w benign DISH, anterior bony bridging from C4-T2, moderate facet 

arthropathy with bilateral foraminal compromise throughout the mid cervical 

levels from C3-C6. WBC was 6.1, hgb 10.8, platelets 147, Na 142, K 4.4 , Cr 

1.17, LFTs wnl, troponin negative, covid-19 negative and PTT was 49.8. Dr. Lane

discussed transfer to [facility] given high risk for evolving bleeding and the 

wife, madelin, who is the HCP declined transfer or invasive approaches to 

management. She opted to stay at [facility] and make him CMO at this time. I will 

therefore admit him to medicine with goal for comfort measures and will monitor 

for changes with no further pursue of imaging or lab investigations at this 

time. She otherwise reported no recent knowledge of recent fever, chills, SOB, 

complaints of chest pain or palpitations.





PAST MEDICAL HISTORY:


#HTN


#HLD


#GERD


#JOSE


#DM


#RA


#History of closed TBI


#dementia


#glaucoma


#chronic AFib





ALLERGIES: Please see below.





REVIEW OF SYSTEMS:


Negative except as per HPI.





HOME MEDICATIONS: Please see below. 





PHYSICAL EXAMINATION:


VITAL SIGNS: See below


General: NAD, lying comfortably in bed, sleeping and responds mostly with " I 

don't know" without opening his eye much though he opens on request, briefly


HEENT: Large at least 7cm at this time R forehead hematoma with dried blood 

lacerations, EOMI, PERRL


Lungs: CTAB


Heart: +S1S2, irregularly irregular, no noted murmurs


Abd: soft, NT, +BS, obese


Ext: Bilateral LE edema, WWP


Neuro: Alert on voice, CN3,4,6 intact, no facial droops. No reliably responding 

to confirm following of commands


Skin: Large at least 7cm at this time R forehead hematoma with dried blood 

lacerations. No noted rashes or bruising at this time.





LABORATORY DATA and IMAGING: as noted above





MICROBIOLOGY: Please see below. 





ASSESSMENT: 83 yo M with PMHx for HTN, HLD, GERD, JOSE, DM, RA, closed TBI, 

dementia sent from [facility] for fall with noted head trauma and found to

have a large forehead hematoma and L parietal contusion with c/f potential brain

bleed who is now CMO.





Fall with head trauma w/ large forehead hematoma and L parietal contusion with 

c/f potential brain bleed:


-[place]CP declined [facility] [place] and invasive treatment


-Now CMO


-s/p Kcentra in the ED


-dc eliquis


-seizure precautions


-PRN tylenol for pain


-morphine PRN for severe pain


-ativan for anxiety and may utilize if he has noted seizure activity


-fall risk





#afib


-Rate well controlled


-DC eliquis given fall and bleeding





#HLD


-Discontinue statin now that he is CMO





#RA


- MTX discontinued





#GERD


-discontinue omeprazole





#DVT prophylaxis


-TEDs given ongoing bleeding





CODE status: DNR/DNI, CMO





Vital Signs





Vital Signs








  Date Time  Temp Pulse Resp B/P (MAP) Pulse Ox O2 Delivery O2 Flow Rate FiO2


 


5/13/21 09:22  66 17 133/77 (95)    


 


5/13/21 08:38 97.6    98   











Laboratory Data


Labs 24H


Laboratory Tests 2


5/13/21 09:37: 


Immature Granulocyte % (Auto) 0.3, Neutrophils (%) (Auto) 59.5, Lymphocytes (%) 

(Auto) 31.7, Monocytes (%) (Auto) 6.9, Eosinophils (%) (Auto) 1.3, Basophils (%)

(Auto) 0.3, Neutrophils # (Auto) 3.6, Lymphocytes # (Auto) 1.9, Monocytes # 

(Auto) 0.4, Eosinophils # (Auto) 0.1, Basophils # (Auto) 0.0, Nucleated Red 

Blood Cells % (auto) 0.0, Prothrombin Time 16.0H, Prothromb Time International 

Ratio 1.25, Activated Partial Thromboplast Time 49.8H, Anion Gap 4L, Glomerular 

Filtration Rate > 60.0, Calcium Level 8.8, Total Bilirubin 0.6, Aspartate Amino 

Transf (AST/SGOT) 21, Alanine Aminotransferase (ALT/SGPT) 27, Alkaline 

Phosphatase 124H, Total Protein 7.4, Albumin 3.7, Albumin/Globulin Ratio 1.0


CBC/BMP


Laboratory Tests


5/13/21 09:37








Microbiology





Microbiology


5/13/21 Respiratory Virus Panel (PCR) ([facility]) - Final, Complete





Home Medications


Scheduled


Acetaminophen (Acetaminophen) 500 Mg Tablet, 500 MG PO BID


Apixaban (Eliquis) 5 Mg Tablet, 5 MG PO BID


Brimonidine Tartrate (Brimonidine Tartrate) 0.15% 5ML Drops, 1 DROP OU BID


Folic Acid (Folic Acid) 1 Mg Tablet, 1 MG PO DAILY


Lactulose (Lactulose) 10 Gm/15 Ml Solution, 30 ML PO BID for constipation


Latanoprost (Xalatan) 0.005% 2.5ML Drops, 1 DROP OU QHS


Methotrexate Sodium (Methotrexate) 2.5 Mg Tablet, 7.5 MG PO QWEEK


   [place] 


Multivitamins (Thera M Plus Tablet) 1 Each Tablet, 1 TAB PO DAILY


Omeprazole (Omeprazole) 10 Mg Capsule.dr, 10 MG PO DAILY


Quetiapine Fumarate (Quetiapine Fumarate) 100 Mg Tablet, 100 MG PO BID


Rosuvastatin Calcium (Rosuvastatin Calcium) 40 Mg Tablet, 40 MG PO QHS


Sennosides/Docusate Sodium (Senna Plus Tablet) 1 Each Tablet, 2 TAB PO BID





Scheduled PRN


Acetaminophen (Acetaminophen) 325 Mg Tablet, 650 MG PO Q4H PRN for PAIN


Bisacodyl (Dulcolax) 10 Mg Supp.rect, 10 MG NC DAILY PRN for CONSTIPATION


Magnesium Hydroxide (Milk of Magnesia) 400 Mg/5 Ml Oral.susp, 2,400 MG PO DAILY 

PRN for CONSTIPATION


Sodium Phosphate,Mono-Dibasic (Enema) 133 Ml Enema, 1 LEVON NC DAILY PRN for 

CONSTIPATION





Allergies


Coded Allergies:  


     donepezil (Verified  Adverse Reaction, Severe, sinus pause 2.2 seconds 

10/1/19 on telemetry, 10/2/19)


     timolol (Verified  Adverse Reaction, Intermediate, hypotension, 10/2/19)


   hypotension





A-FIB/CHADSVASC


A-FIB History


Current/History of A-Fib/PAF?:  Yes


Current PO Anticoag Therapy:  No





Treatment


Reason Anticoagulant not given:  Current bleeding











SHANT PRIDE MD   May 13, 2021 11:43

## 2021-05-13 NOTE — REPVR
PROCEDURE INFORMATION: 

Exam: CT Head Without Contrast 

Exam date and time: 5/13/2021 8:23 AM 

Age: 85 years old 

Clinical indication: Injury or trauma; Fall; Blunt trauma (contusions or 

hematomas); Additional info: Fall on eliquis 



TECHNIQUE: 

Imaging protocol: Computed tomography of the head without contrast. 

Radiation optimization: All CT scans at this facility use at least one of these 

dose optimization techniques: automated exposure control; mA and/or kV 

adjustment per patient size (includes targeted exams where dose is matched to 

clinical indication); or iterative reconstruction. 



COMPARISON: 

CT Head without contrast 12/7/2020 1:13 PM 



FINDINGS: 

Brain: There is moderate atrophy and chronic white matter microangiopathic 

changes. There is a 6 mm oval hyperintensity of a left parietal gyrus on axial 

image 17 at the atrial level confirmed on coronal image 28 consistent with 

cortical contusion. 

Cerebral ventricles: There is compensatory ventricular dilation. 

Bones/joints: There is no evidence of fracture. 

Paranasal sinuses: Visualized sinuses are unremarkable. No fluid levels. 

Mastoid air cells: Visualized mastoid air cells are well aerated. 

Vasculature: The vasculature demonstrates diffuse moderate atherosclerotic 

calcification. 

Soft tissues: There is a 3 cm broad hematoma of the right forehead on axial 

image 8.There is no evidence of a radio-opaque foreign body. 



IMPRESSION: 

1. There is a 3 cm broad hematoma of the right forehead on axial image 8.There 

is no evidence of a radio-opaque foreign body. 

2. There is no evidence of fracture. 

3. There is a 6 mm oval hyperintensity of a left parietal gyrus on axial image 

17 at the atrial level confirmed on coronal image 28 consistent with cortical 

contusion. 



Electronically signed by: Jimi Andino On 05/13/2021  08:48:15 AM

## 2021-05-14 RX ADMIN — MORPHINE SULFATE PRN MG: 2 INJECTION, SOLUTION INTRAMUSCULAR; INTRAVENOUS at 00:04

## 2021-05-14 RX ADMIN — MORPHINE SULFATE PRN MG: 2 INJECTION, SOLUTION INTRAMUSCULAR; INTRAVENOUS at 08:13

## 2021-05-14 RX ADMIN — BRIMONIDINE TARTRATE SCH DROP: 1.5 SOLUTION OPHTHALMIC at 08:13

## 2021-05-14 NOTE — DS.PDOC
Discharge Summary


General


Date of Admission


May 13, 2021 at 10:49


Date of Discharge


5/14/2021


Attending Physician:  SHANT PRIDE MD





Discharge Summary


PROCEDURES PERFORMED DURING STAY: None





ADMITTING DIAGNOSES: 


Fall


Dementia





DISCHARGE DIAGNOSES:


Large R forehead hematoma.


Left parietal gyrus cortical contusion.


History of HTN


HLD


GERD


JOSE


DM


RA


History of closed TBI


Glaucoma


Chronic AFib





COMPLICATIONS/CHIEF COMPLAINT: Dementia,Fall,Intracranial Hematoma.





HISTORY OF PRESENT ILLNESS: 


84 yo man with a history of advanced dementia, chronic Afib on eliquis and a 

history of frequent falls, resident of [facility] who was brought in after being found 

down early 5/13 with evidence of head trauma. On arrival he was minimally 

responsive but otherwise hemodynamically stable, afebrile and breathing 

comfortably on room air. 





HOSPITAL COURSE:


Initial workup included a CT head that showed 3cm hematoma of the R forehead 

without evidence of a fracture, as well as a 6mm oval hypointensity of a L 

parietal gyrus c/w at leas t a cortical contusion while CT of the C-spine did 

not reveal an acute fracture but had diffuse anthesopathic changes c/w benign 

DISH, anterior bony bridging from C4-T2, moderate facet arthropathy with 

bilateral foraminal compromise throughout the mid cervical levels from C3-C6. 

WBC was 6.1, hgb 10.8, platelets 147, Na 142, K 4.4 , Cr 1.17, LFTs wnl, 

troponin negative, covid-19 negative and PTT was 49.8. Dr. Lane discussed 

transfer to [facility] given high risk for evolving bleeding and the wife, Dayana, 

who is the HCP declined transfer or invasive approaches to management. She opted

to stay at [facility] and make him CMO with the understanding that with that decision 

we would not pursue further investigations that whose goal would be to prolong 

life but pursue comfort measures only. Of note, he was given Kcentra in the ED. 

He was admitted to medicine with goal for comfort measures and monitoring for 

changes with no further pursue of imaging or lab investigations. On day 2 of 

hospitalization, he was up, walking, be it, with a walker with mildly unsteady 

gait from baseline but grossly nonfocal, and Dayana decided to make him DNR/DNI 

and ok for non-invasive treatments and requested his return to [facility]. He had a 

follow up CT head at that time that showed no evidence of further bleeding. He 

worked with PT and they recommended a walker. He is now being discharged back to

[facility].





DISCHARGE MEDICATIONS: Please see below.


 


ALLERGIES: Please see below.





PHYSICAL EXAMINATION ON DISCHARGE:


VITAL SIGNS: Please see below.


General: NAD, pleasantly confused, chatty


HEENT: Large R forehead hematoma with dried blood laceration, EOMI, PERRL, 

purple bruise on R forehead and R upper face


Lungs: CTAB


Heart: +S1S2, irregularly irregular, no noted murmurs


Abd: soft, NT, +BS, obese


Ext: Bilateral LE edema, WWP


Neuro: Alert, oriented to self and wife only, follwing commands, CN3-12 intact, 

no focal deficits noted. 





LABORATORY DATA: Please see below.





IMAGING: 


CT C-spine:


Bones/joints: There are diffuse enthesopathic changes consistent with benign 


diffuse idiopathic skeletal hyperostosis (DISH). There is anterior bony 


bridging from C4 through T2. There is moderate facet arthropathy with bilateral 


foraminal compromise throughout the mid cervical levels from C3 through C6. 


There is no evidence of fracture. 


Discs/Spinal canal/Neural foramina: See "Bones/joints" finding. 





Thyroid: The thyroid gland is normal. 


Lungs: The visualized portions of the lung apices are normal. 


Vasculature: The vasculature demonstrates diffuse moderate atherosclerotic 


calcification. 


Soft tissues: Unremarkable. 





IMPRESSION: 


1. There are diffuse enthesopathic changes consistent with benign diffuse 


idiopathic skeletal hyperostosis (DISH). There is anterior bony bridging from 


C4 through T2. There is moderate facet arthropathy with bilateral foraminal 


compromise throughout the mid cervical levels from C3 through C6. 


2. There is no evidence of fracture. 





5/13 Admission CT head:


Brain: There is moderate atrophy and chronic white matter microangiopathic 


changes. There is a 6 mm oval hyperintensity of a left parietal gyrus on axial 


image 17 at the atrial level confirmed on coronal image 28 consistent with 


cortical contusion. 


Cerebral ventricles: There is compensatory ventricular dilation. 


Bones/joints: There is no evidence of fracture. 


Paranasal sinuses: Visualized sinuses are unremarkable. No fluid levels. 


Mastoid air cells: Visualized mastoid air cells are well aerated. 


Vasculature: The vasculature demonstrates diffuse moderate atherosclerotic 


calcification. 


Soft tissues: There is a 3 cm broad hematoma of the right forehead on axial 


image 8.There is no evidence of a radio-opaque foreign body. 





IMPRESSION: 


1. There is a 3 cm broad hematoma of the right forehead on axial image 8.There 


is no evidence of a radio-opaque foreign body. 


2. There is no evidence of fracture. 


3. There is a 6 mm oval hyperintensity of a left parietal gyrus on axial image 


17 at the atrial level confirmed on coronal image 28 consistent with cortical 


contusion. 





5/14 CT head:


Brain: There is low attenuation abnormality in the periventricular white matter 


consistent with chronic microvascular ischemic changes. There are chronic 


lacunar infarcts. There is moderate cerebral atrophy. 


Cerebral ventricles: No ventriculomegaly. 


Bones/joints: There is chronic deformity of the left zygomatic arch consistent 


with old fracture. 


Paranasal sinuses: Visualized sinuses are unremarkable. No fluid levels. 


Mastoid air cells: Visualized mastoid air cells are well aerated. 


Vasculature: There is moderate intracranial vascular calcification. 


Soft tissues: There is a right frontal/periorbital scalp hematoma. There is no 


underlying fracture. 





IMPRESSION: 


No posttraumatic intracranial findings. Please see details above. 





PROGNOSIS: Guarded. 





ACTIVITY: As tolerated





DIET: regular diet





DISCHARGE PLAN: back to [facility]





DISPOSITION: [facility]





DISCHARGE INSTRUCTIONS:


Fall risk. To use walker. Would benefit from continued PT. Discontinued eliquis.





ITEMS TO FOLLOWUP ON ON OUTPATIENT:


PCP follow up





DISCHARGE CONDITION: Stable





TIME SPENT ON DISCHARGE: 45 minutes.





Vital Signs/I&Os





Vital Signs








  Date Time  Temp Pulse Resp B/P (MAP) Pulse Ox O2 Delivery O2 Flow Rate FiO2


 


5/13/21 12:19 97.6 67 17 139/81 (100) 100 Room Air  














I&O- Last 24 Hours up to 6 AM 


 


 5/14/21





 06:00


 


Intake Total 670 ml


 


Output Total 0 ml


 


Balance 670 ml











Microbiology





Microbiology


5/13/21 Respiratory Virus Panel (PCR) ([facility]) - Final, Complete





Discharge Medications


Scheduled


Acetaminophen (Acetaminophen) 500 Mg Tablet, 500 MG PO BID, (Reported)


Brimonidine Tartrate (Brimonidine Tartrate) 0.15% 5ML Drops, 1 DROP OU BID, 

(Reported)


Folic Acid (Folic Acid) 1 Mg Tablet, 1 MG PO DAILY, (Reported)


Lactulose (Lactulose) 10 Gm/15 Ml Solution, 30 ML PO BID for constipation, 

(Reported)


Latanoprost (Xalatan) 0.005% 2.5ML Drops, 1 DROP OU QHS, (Reported)


Methotrexate Sodium (Methotrexate) 2.5 Mg Tablet, 7.5 MG PO QWEEK, (Reported)


   [place] 


Multivitamins (Thera M Plus Tablet) 1 Each Tablet, 1 TAB PO DAILY, (Reported)


Omeprazole (Omeprazole) 10 Mg Capsule.dr, 10 MG PO DAILY, (Reported)


Quetiapine Fumarate (Quetiapine Fumarate) 100 Mg Tablet, 100 MG PO BID, 

(Reported)


Rosuvastatin Calcium (Rosuvastatin Calcium) 40 Mg Tablet, 40 MG PO QHS, (Rep

orted)


Sennosides/Docusate Sodium (Senna Plus Tablet) 1 Each Tablet, 2 TAB PO BID, 

(Reported)





Scheduled PRN


Acetaminophen (Acetaminophen) 325 Mg Tablet, 650 MG PO Q4H PRN for PAIN, 

(Reported)


Bisacodyl (Dulcolax) 10 Mg Supp.rect, 10 MG [place] DAILY PRN for CONSTIPATION, 

(Reported)


Magnesium Hydroxide (Milk of Magnesia) 400 Mg/5 Ml Oral.susp, 2,400 MG PO DAILY 

PRN for CONSTIPATION, (Reported)


Sodium Phosphate,Mono-Dibasic (Enema) 133 Ml Enema, 1 LEVON [place] DAILY PRN for 

CONSTIPATION, (Reported)





Allergies


Coded Allergies:  


     donepezil (Verified  Adverse Reaction, Severe, sinus pause 2.2 seconds 

10/1/19 on telemetry, 10/2/19)


     timolol (Verified  Adverse Reaction, Intermediate, hypotension, 10/2/19)


   hypotension











SHANT PRIDE MD   May 14, 2021 10:38

## 2021-05-14 NOTE — REPVR
PROCEDURE INFORMATION: 

Exam: CT Head Without Contrast 

Exam date and time: 5/14/2021 8:26 AM 

Age: 85 years old 

Clinical indication: Injury or trauma; Fall; Blunt trauma (contusions or 

hematomas); Additional info: Recent fall with head trauma 



TECHNIQUE: 

Imaging protocol: Computed tomography of the head without contrast. 

Radiation optimization: All CT scans at this facility use at least one of these 

dose optimization techniques: automated exposure control; mA and/or kV 

adjustment per patient size (includes targeted exams where dose is matched to 

clinical indication); or iterative reconstruction. 



COMPARISON: 

CT Head without contrast 5/13/2021 8:27 AM 



FINDINGS: 

Brain: There is low attenuation abnormality in the periventricular white matter 

consistent with chronic microvascular ischemic changes. There are chronic 

lacunar infarcts. There is moderate cerebral atrophy. 

Cerebral ventricles: No ventriculomegaly. 

Bones/joints: There is chronic deformity of the left zygomatic arch consistent 

with old fracture. 

Paranasal sinuses: Visualized sinuses are unremarkable. No fluid levels. 

Mastoid air cells: Visualized mastoid air cells are well aerated. 

Vasculature: There is moderate intracranial vascular calcification. 

Soft tissues: There is a right frontal/periorbital scalp hematoma. There is no 

underlying fracture. 



IMPRESSION: 

No posttraumatic intracranial findings. Please see details above. 



Electronically signed by: Eitan Alvarez On 05/14/2021  09:13:28 AM

## 2021-06-10 ENCOUNTER — HOSPITAL ENCOUNTER (OUTPATIENT)
Dept: HOSPITAL 53 - SKLAB6 | Age: 86
End: 2021-06-10
Attending: INTERNAL MEDICINE
Payer: MEDICARE

## 2021-06-10 DIAGNOSIS — Z51.81: Primary | ICD-10-CM

## 2021-06-10 LAB
ALBUMIN SERPL BCG-MCNC: 3.8 GM/DL (ref 3.2–5.2)
ALT SERPL W P-5'-P-CCNC: 22 U/L (ref 12–78)
BILIRUB SERPL-MCNC: 0.8 MG/DL (ref 0.2–1)
BUN SERPL-MCNC: 25 MG/DL (ref 7–18)
CALCIUM SERPL-MCNC: 8.9 MG/DL (ref 8.8–10.2)
CHLORIDE SERPL-SCNC: 104 MEQ/L (ref 98–107)
CO2 SERPL-SCNC: 30 MEQ/L (ref 21–32)
CREAT SERPL-MCNC: 1.27 MG/DL (ref 0.7–1.3)
GFR SERPL CREATININE-BSD FRML MDRD: 57.5 ML/MIN/{1.73_M2} (ref 35–?)
GLUCOSE SERPL-MCNC: 115 MG/DL (ref 70–100)
HCT VFR BLD AUTO: 36.2 % (ref 42–52)
HGB BLD-MCNC: 11.6 G/DL (ref 13.5–17.5)
MCH RBC QN AUTO: 30.1 PG (ref 27–33)
MCHC RBC AUTO-ENTMCNC: 32 G/DL (ref 32–36.5)
MCV RBC AUTO: 93.8 FL (ref 80–96)
PLATELET # BLD AUTO: 161 10^3/UL (ref 150–450)
POTASSIUM SERPL-SCNC: 4.3 MEQ/L (ref 3.5–5.1)
PROT SERPL-MCNC: 7.6 GM/DL (ref 6.4–8.2)
RBC # BLD AUTO: 3.86 10^6/UL (ref 4.3–6.1)
SODIUM SERPL-SCNC: 140 MEQ/L (ref 136–145)
WBC # BLD AUTO: 5.6 10^3/UL (ref 4–10)

## 2021-08-17 ENCOUNTER — HOSPITAL ENCOUNTER (OUTPATIENT)
Dept: HOSPITAL 53 - SKLAB6 | Age: 86
End: 2021-08-17
Attending: INTERNAL MEDICINE
Payer: MEDICARE

## 2021-08-17 DIAGNOSIS — M06.9: Primary | ICD-10-CM

## 2021-08-17 DIAGNOSIS — Z79.899: ICD-10-CM

## 2021-08-17 LAB
ALBUMIN SERPL BCG-MCNC: 3.2 GM/DL (ref 3.2–5.2)
ALT SERPL W P-5'-P-CCNC: 25 U/L (ref 12–78)
BILIRUB SERPL-MCNC: 0.6 MG/DL (ref 0.2–1)
BUN SERPL-MCNC: 22 MG/DL (ref 7–18)
CALCIUM SERPL-MCNC: 8.2 MG/DL (ref 8.8–10.2)
CHLORIDE SERPL-SCNC: 110 MEQ/L (ref 98–107)
CO2 SERPL-SCNC: 30 MEQ/L (ref 21–32)
CREAT SERPL-MCNC: 1.07 MG/DL (ref 0.7–1.3)
GFR SERPL CREATININE-BSD FRML MDRD: > 60 ML/MIN/{1.73_M2} (ref 35–?)
GLUCOSE SERPL-MCNC: 98 MG/DL (ref 70–100)
POTASSIUM SERPL-SCNC: 4 MEQ/L (ref 3.5–5.1)
PROT SERPL-MCNC: 6.6 GM/DL (ref 6.4–8.2)
SODIUM SERPL-SCNC: 141 MEQ/L (ref 136–145)

## 2021-09-01 ENCOUNTER — HOSPITAL ENCOUNTER (EMERGENCY)
Dept: HOSPITAL 53 - M ED | Age: 86
Discharge: ANOTHER HOSPITAL | End: 2021-09-01
Payer: MEDICARE

## 2021-09-01 VITALS — HEIGHT: 67 IN | WEIGHT: 200.42 LBS | BODY MASS INDEX: 31.46 KG/M2

## 2021-09-01 VITALS — SYSTOLIC BLOOD PRESSURE: 167 MMHG | DIASTOLIC BLOOD PRESSURE: 106 MMHG

## 2021-09-01 DIAGNOSIS — E11.9: ICD-10-CM

## 2021-09-01 DIAGNOSIS — G47.33: ICD-10-CM

## 2021-09-01 DIAGNOSIS — M06.9: ICD-10-CM

## 2021-09-01 DIAGNOSIS — G30.9: ICD-10-CM

## 2021-09-01 DIAGNOSIS — K21.9: ICD-10-CM
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
## 2021-09-01 DIAGNOSIS — Z79.899: ICD-10-CM

## 2021-09-01 DIAGNOSIS — Z87.820: ICD-10-CM

## 2021-09-01 DIAGNOSIS — I48.91: ICD-10-CM
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
## 2021-09-01 DIAGNOSIS — S12.9XXA: Primary | ICD-10-CM

## 2021-09-01 DIAGNOSIS — H40.9: ICD-10-CM

## 2021-09-01 DIAGNOSIS — Z88.8: ICD-10-CM

## 2021-09-01 DIAGNOSIS — M25.78: ICD-10-CM

## 2021-09-01 DIAGNOSIS — S01.81XA: ICD-10-CM
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
## 2021-09-01 DIAGNOSIS — I10: ICD-10-CM
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
## 2021-09-01 LAB
ALBUMIN SERPL BCG-MCNC: 3.3 GM/DL (ref 3.2–5.2)
ALT SERPL W P-5'-P-CCNC: 28 U/L (ref 12–78)
BASOPHILS # BLD AUTO: 0 10^3/UL (ref 0–0.2)
BASOPHILS NFR BLD AUTO: 0.2 % (ref 0–1)
BILIRUB CONJ SERPL-MCNC: 0.2 MG/DL (ref 0–0.2)
BILIRUB SERPL-MCNC: 0.6 MG/DL (ref 0.2–1)
BUN SERPL-MCNC: 25 MG/DL (ref 7–18)
CALCIUM SERPL-MCNC: 8.4 MG/DL (ref 8.8–10.2)
CHLORIDE SERPL-SCNC: 106 MEQ/L (ref 98–107)
CK MB CFR.DF SERPL CALC: 1.77
CK MB SERPL-MCNC: 2.3 NG/ML (ref ?–3.6)
CK SERPL-CCNC: 130 U/L (ref 39–308)
CO2 SERPL-SCNC: 28 MEQ/L (ref 21–32)
CREAT SERPL-MCNC: 1.15 MG/DL (ref 0.7–1.3)
EOSINOPHIL # BLD AUTO: 0.1 10^3/UL (ref 0–0.5)
EOSINOPHIL NFR BLD AUTO: 1.1 % (ref 0–3)
GFR SERPL CREATININE-BSD FRML MDRD: > 60 ML/MIN/{1.73_M2} (ref 35–?)
GLUCOSE SERPL-MCNC: 112 MG/DL (ref 70–100)
HCT VFR BLD AUTO: 32.8 % (ref 42–52)
HGB BLD-MCNC: 10.6 G/DL (ref 13.5–17.5)
LYMPHOCYTES # BLD AUTO: 1.7 10^3/UL (ref 1.5–5)
LYMPHOCYTES NFR BLD AUTO: 25.9 % (ref 24–44)
MCH RBC QN AUTO: 30.7 PG (ref 27–33)
MCHC RBC AUTO-ENTMCNC: 32.3 G/DL (ref 32–36.5)
MCV RBC AUTO: 95.1 FL (ref 80–96)
MONOCYTES # BLD AUTO: 0.3 10^3/UL (ref 0–0.8)
MONOCYTES NFR BLD AUTO: 5.1 % (ref 2–8)
NEUTROPHILS # BLD AUTO: 4.4 10^3/UL (ref 1.5–8.5)
NEUTROPHILS NFR BLD AUTO: 67.2 % (ref 36–66)
PLATELET # BLD AUTO: 156 10^3/UL (ref 150–450)
POTASSIUM SERPL-SCNC: 4.9 MEQ/L (ref 3.5–5.1)
PROT SERPL-MCNC: 7.1 GM/DL (ref 6.4–8.2)
RBC # BLD AUTO: 3.45 10^6/UL (ref 4.3–6.1)
RSV RNA NPH QL NAA+PROBE: NEGATIVE
SODIUM SERPL-SCNC: 138 MEQ/L (ref 136–145)
T4 FREE SERPL-MCNC: 0.65 NG/DL (ref 0.76–1.46)
TROPONIN I SERPL-MCNC: < 0.02 NG/ML (ref ?–0.1)
TSH SERPL DL<=0.005 MIU/L-ACNC: 3.62 UIU/ML (ref 0.36–3.74)
WBC # BLD AUTO: 6.5 10^3/UL (ref 4–10)

## 2021-09-01 NOTE — REPVR
PROCEDURE INFORMATION: 

Exam: CT Cervical Spine Without Contrast 

Exam date and time: 9/1/2021 12:27 PM 

Age: 86 years old 

Clinical indication: Injury or trauma; Fall; Blunt trauma 



TECHNIQUE: 

Imaging protocol: Computed tomography images of the cervical spine without 

contrast. 

Radiation optimization: All CT scans at this facility use at least one of these 

dose optimization techniques: automated exposure control; mA and/or kV 

adjustment per patient size (includes targeted exams where dose is matched to 

clinical indication); or iterative reconstruction. 



COMPARISON: 

CT Spine,cervical w/o contrast 5/13/2021 8:27 AM 



FINDINGS: 

Bones/joints:  There is incidental atlantooccipital fusion.  There are coarse 

ventral bridging osteophytes, compatible with DISH.  As compared to the 

preceding examination, a focal lucency now traverses the C4/5 ventral 

osteophyte, worrisome for interval fracture.  Normal vertebral body alignment 

and heights are preserved.

Discs/Spinal canal/Neural foramina:  There are multilevel disc osteophyte 

complexes, most pronounced at C5/6.  There is multilevel facet hypertrophy.  

There is multilevel moderate to severe neural foraminal narrowing.



Lungs: Lung apices are normal. 

Soft tissues: Unremarkable. 



IMPRESSION: 

Suspected acute C4/5 ventral osteophyte fracture.



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  The area of positive cancer is as marked on the Mohs map.

## 2021-09-01 NOTE — REP
INDICATION:

trauma.



COMPARISON:

03/17/2020.



TECHNIQUE:

Single portable AP view of the chest was performed.



FINDINGS:

There is mild cardiomegaly.  There is no acute infiltrate.  The mediastinal silhouette

is unremarkable.  There are degenerative changes at both shoulders.



IMPRESSION:

No acute pulmonary disease.Mild cardiomegaly.





<Electronically signed by Joaquín Rosas > 09/01/21 [id number]

## 2021-09-01 NOTE — REPVR
PROCEDURE INFORMATION: 

Exam: CT Head Without Contrast 

Exam date and time: 9/1/2021 12:27 PM 

Age: 86 years old 

Clinical indication: Injury or trauma; Fall; Blunt trauma (contusions or 

hematomas) 



TECHNIQUE: 

Imaging protocol: Computed tomography of the head without contrast. 

Radiation optimization: All CT scans at this facility use at least one of these 

dose optimization techniques: automated exposure control; mA and/or kV 

adjustment per patient size (includes targeted exams where dose is matched to 

clinical indication); or iterative reconstruction. 



COMPARISON: 

CT Head without contrast 5/14/2021 8:50 AM 



FINDINGS: 

Brain: There is no acute intracranial hemorrhage or mass effect. Moderate 

diffuse volume loss is within the range of normal for patient age. There are 

small vessel ischemic changes within the periventricular and subcortical white 

matter, but the normal gray-white matter delineation is maintained. 

Cerebral ventricles:  Prominence of the ventricular system is commensurate with 

volume loss.

Paranasal sinuses: Visualized sinuses are unremarkable. No fluid levels. 

Mastoid air cells: Visualized mastoid air cells are well aerated. 

Bones/joints: Unremarkable. No acute fracture. 

Soft tissues:  There is inferior frontal soft tissue injury.



IMPRESSION: 

No acute hemorrhage or calvarial fracture.



Electronically signed by: Elaina Amado On 09/01/2021  12:57:57 PM

## 2021-09-01 NOTE — REPVR
PROCEDURE INFORMATION: 

Exam: CT Maxillofacial Without Contrast 

Exam date and time: 9/1/2021 12:27 PM 

Age: 86 years old 

Clinical indication: Injury or trauma; Fall; Blunt trauma (contusions or 

hematomas); Nose 



TECHNIQUE: 

Imaging protocol: Computed tomography images of the face without contrast. 

Radiation optimization: All CT scans at this facility use at least one of these 

dose optimization techniques: automated exposure control; mA and/or kV 

adjustment per patient size (includes targeted exams where dose is matched to 

clinical indication); or iterative reconstruction. 



COMPARISON: 

CT Head without contrast 5/14/2021 8:50 AM 



FINDINGS: 

Orbital cavity: Orbits are normal. Globes are unremarkable. 

Bones/joints: There are chronic left lateral orbital wall, left maxillary sinus 

wall, left lateral pterygoid plate and left zygoma fractures. 

Paranasal sinuses: Normal. No air-fluid levels. 

Soft tissues: There is inferior frontal/facial soft tissue injury. 



IMPRESSION: 

Soft tissue injury and chronic left facial fractures.  No acute fracture.



Electronically signed by: Elaina Amado On 09/01/2021  13:05:50 PM

## 2021-09-01 NOTE — ECGEPIP
[facility] - ED

                                       

                                       Test Date:    2021

Pat Name:     KEITH DAILY          Department:   

Patient ID:   [id number]                 Room:         -

Gender:       Male                     Technician:   

:          [phone]               Requested By: KRUPA STYLES

Order Number: [id number]     Reading MD:   Maja Lundborg-Gray

                                 Measurements

Intervals                              Axis          

Rate:         80                       P:            

NH:                                    QRS:          72

QRSD:         88                       T:            48

QT:           400                                    

QTc:          461                                    

                           Interpretive Statements

Atrial fibrillation

Anterior infarct , age undetermined

Nonspecific ST T wave changes 

 

cw 3/5/20 rate increased

Nonspecific ST T wave changes

Electronically Signed on 2021 19:40:38 EDT by Maja Lundborg-Gray

## 2021-09-07 ENCOUNTER — HOSPITAL ENCOUNTER (OUTPATIENT)
Dept: HOSPITAL 53 - SKLAB6 | Age: 86
End: 2021-09-07
Attending: INTERNAL MEDICINE
Payer: MEDICARE

## 2021-09-07 DIAGNOSIS — M19.041: Primary | ICD-10-CM

## 2021-09-07 NOTE — REP
INDICATION:

SWELLING IN RIGHT INDEX FINGER.



COMPARISON:

Comparison radiographs of the right hand are from May 7, 2009.



TECHNIQUE:

Four views of the right hand are obtained.  Patient apparently was unable to extend

the MCP or IP joints, flexion contracture.  Positioning is therefore suboptimal.



FINDINGS:

Four views of the right hand demonstrate considerable bony overlap due to flexion

deformity.  The hand is essentially in a fist configuration.  There is osteoarthritis

at the D IP joints of the all 4 fingers.  No soft tissue gas is seen.  Extensive

vascular calcification is noted across the wrist.  No fracture is evident.    No

opaque foreign body noted.





IMPRESSION:

Suboptimal positioning due to flexion deformity.  Osteoarthritic changes.  No acute

bony abnormality is appreciated.  Vascular calcification.







<Electronically signed by Silvio Lindsey > 09/07/21 [id number]

## 2021-10-21 ENCOUNTER — HOSPITAL ENCOUNTER (OUTPATIENT)
Dept: HOSPITAL 53 - SKLAB6 | Age: 86
End: 2021-10-21
Attending: INTERNAL MEDICINE
Payer: MEDICARE

## 2021-10-21 DIAGNOSIS — Z20.822: Primary | ICD-10-CM

## 2021-10-25 ENCOUNTER — HOSPITAL ENCOUNTER (OUTPATIENT)
Dept: HOSPITAL 53 - SKLAB6 | Age: 86
End: 2021-10-25
Attending: INTERNAL MEDICINE
Payer: MEDICARE

## 2021-10-25 DIAGNOSIS — Z20.822: Primary | ICD-10-CM

## 2021-10-28 ENCOUNTER — HOSPITAL ENCOUNTER (OUTPATIENT)
Dept: HOSPITAL 53 - SKLAB6 | Age: 86
End: 2021-10-28
Attending: INTERNAL MEDICINE
Payer: MEDICARE

## 2021-10-28 DIAGNOSIS — Z20.822: Primary | ICD-10-CM

## 2021-11-01 ENCOUNTER — HOSPITAL ENCOUNTER (OUTPATIENT)
Dept: HOSPITAL 53 - SKLAB6 | Age: 86
End: 2021-11-01
Attending: INTERNAL MEDICINE
Payer: MEDICARE

## 2021-11-01 DIAGNOSIS — Z20.822: Primary | ICD-10-CM

## 2021-11-04 ENCOUNTER — HOSPITAL ENCOUNTER (OUTPATIENT)
Dept: HOSPITAL 53 - SKLAB6 | Age: 86
End: 2021-11-04
Attending: INTERNAL MEDICINE
Payer: MEDICARE

## 2021-11-04 DIAGNOSIS — Z20.822: Primary | ICD-10-CM

## 2021-11-10 ENCOUNTER — HOSPITAL ENCOUNTER (OUTPATIENT)
Dept: HOSPITAL 53 - SKLAB6 | Age: 86
End: 2021-11-10
Attending: INTERNAL MEDICINE
Payer: MEDICARE

## 2021-11-10 DIAGNOSIS — Z20.822: Primary | ICD-10-CM

## 2021-11-17 ENCOUNTER — HOSPITAL ENCOUNTER (OUTPATIENT)
Dept: HOSPITAL 53 - SKLAB6 | Age: 86
End: 2021-11-17
Attending: INTERNAL MEDICINE
Payer: MEDICARE

## 2021-11-17 DIAGNOSIS — Z20.822: Primary | ICD-10-CM

## 2021-12-08 ENCOUNTER — HOSPITAL ENCOUNTER (OUTPATIENT)
Dept: HOSPITAL 53 - SKLAB6 | Age: 86
End: 2021-12-08
Attending: INTERNAL MEDICINE
Payer: MEDICARE

## 2021-12-08 DIAGNOSIS — Z20.822: Primary | ICD-10-CM

## 2021-12-15 ENCOUNTER — HOSPITAL ENCOUNTER (OUTPATIENT)
Dept: HOSPITAL 53 - SKLAB6 | Age: 86
End: 2021-12-15
Attending: INTERNAL MEDICINE
Payer: MEDICARE

## 2021-12-15 DIAGNOSIS — Z20.822: Primary | ICD-10-CM

## 2021-12-22 ENCOUNTER — HOSPITAL ENCOUNTER (OUTPATIENT)
Dept: HOSPITAL 53 - SKLAB6 | Age: 86
End: 2021-12-22
Attending: INTERNAL MEDICINE
Payer: MEDICARE

## 2021-12-22 DIAGNOSIS — Z20.822: Primary | ICD-10-CM

## 2021-12-29 ENCOUNTER — HOSPITAL ENCOUNTER (OUTPATIENT)
Dept: HOSPITAL 53 - SKLAB6 | Age: 86
End: 2021-12-29
Attending: INTERNAL MEDICINE
Payer: MEDICARE

## 2021-12-29 DIAGNOSIS — Z20.822: Primary | ICD-10-CM

## 2022-01-05 ENCOUNTER — HOSPITAL ENCOUNTER (OUTPATIENT)
Dept: HOSPITAL 53 - SKLAB6 | Age: 87
End: 2022-01-05
Attending: INTERNAL MEDICINE
Payer: MEDICARE

## 2022-01-05 DIAGNOSIS — Z20.822: Primary | ICD-10-CM

## 2022-01-13 ENCOUNTER — HOSPITAL ENCOUNTER (OUTPATIENT)
Dept: HOSPITAL 53 - SKLAB6 | Age: 87
End: 2022-01-13
Attending: INTERNAL MEDICINE
Payer: MEDICARE

## 2022-01-13 DIAGNOSIS — G30.9: Primary | ICD-10-CM

## 2022-01-13 DIAGNOSIS — F02.81: ICD-10-CM

## 2022-01-13 LAB
ALBUMIN SERPL BCG-MCNC: 2.9 GM/DL (ref 3.2–5.2)
ALT SERPL W P-5'-P-CCNC: 13 U/L (ref 12–78)
BILIRUB SERPL-MCNC: 0.5 MG/DL (ref 0.2–1)
BUN SERPL-MCNC: 26 MG/DL (ref 7–18)
CALCIUM SERPL-MCNC: 8.4 MG/DL (ref 8.8–10.2)
CHLORIDE SERPL-SCNC: 106 MEQ/L (ref 98–107)
CO2 SERPL-SCNC: 30 MEQ/L (ref 21–32)
CREAT SERPL-MCNC: 1.01 MG/DL (ref 0.7–1.3)
CRP SERPL-MCNC: 1.07 MG/DL (ref 0–0.3)
ERYTHROCYTE [SEDIMENTATION RATE] IN BLOOD BY WESTERGREN METHOD: 66 MM/HR (ref 0–20)
GFR SERPL CREATININE-BSD FRML MDRD: > 60 ML/MIN/{1.73_M2} (ref 35–?)
GLUCOSE SERPL-MCNC: 96 MG/DL (ref 70–100)
HCT VFR BLD AUTO: 38.9 % (ref 42–52)
HGB BLD-MCNC: 12.7 G/DL (ref 13.5–17.5)
MCH RBC QN AUTO: 29.9 PG (ref 27–33)
MCHC RBC AUTO-ENTMCNC: 32.6 G/DL (ref 32–36.5)
MCV RBC AUTO: 91.5 FL (ref 80–96)
PLATELET # BLD AUTO: 247 10^3/UL (ref 150–450)
POTASSIUM SERPL-SCNC: 4.5 MEQ/L (ref 3.5–5.1)
PROT SERPL-MCNC: 7 GM/DL (ref 6.4–8.2)
RBC # BLD AUTO: 4.25 10^6/UL (ref 4.3–6.1)
SODIUM SERPL-SCNC: 140 MEQ/L (ref 136–145)
WBC # BLD AUTO: 8.2 10^3/UL (ref 4–10)

## 2022-01-16 ENCOUNTER — HOSPITAL ENCOUNTER (OUTPATIENT)
Dept: HOSPITAL 53 - SKLAB6 | Age: 87
End: 2022-01-16
Attending: INTERNAL MEDICINE
Payer: MEDICARE

## 2022-01-16 DIAGNOSIS — R76.11: Primary | ICD-10-CM

## 2022-01-16 DIAGNOSIS — I51.7: ICD-10-CM

## 2022-02-03 ENCOUNTER — HOSPITAL ENCOUNTER (OUTPATIENT)
Dept: HOSPITAL 53 - SKLAB6 | Age: 87
End: 2022-02-03
Attending: INTERNAL MEDICINE
Payer: MEDICARE

## 2022-02-03 DIAGNOSIS — R76.12: Primary | ICD-10-CM

## 2022-02-08 ENCOUNTER — HOSPITAL ENCOUNTER (OUTPATIENT)
Dept: HOSPITAL 53 - SKLAB6 | Age: 87
End: 2022-02-08
Attending: INTERNAL MEDICINE
Payer: MEDICARE

## 2022-02-08 DIAGNOSIS — M25.552: ICD-10-CM

## 2022-02-08 DIAGNOSIS — M16.12: Primary | ICD-10-CM

## 2022-02-08 LAB
ALBUMIN SERPL BCG-MCNC: 2.7 GM/DL (ref 3.2–5.2)
ALT SERPL W P-5'-P-CCNC: 12 U/L (ref 12–78)
BILIRUB SERPL-MCNC: 0.8 MG/DL (ref 0.2–1)
BUN SERPL-MCNC: 29 MG/DL (ref 7–18)
CALCIUM SERPL-MCNC: 8.9 MG/DL (ref 8.8–10.2)
CHLORIDE SERPL-SCNC: 107 MEQ/L (ref 98–107)
CO2 SERPL-SCNC: 29 MEQ/L (ref 21–32)
CREAT SERPL-MCNC: 1.21 MG/DL (ref 0.7–1.3)
GFR SERPL CREATININE-BSD FRML MDRD: > 60 ML/MIN/{1.73_M2} (ref 35–?)
GLUCOSE SERPL-MCNC: 187 MG/DL (ref 70–100)
POTASSIUM SERPL-SCNC: 3.9 MEQ/L (ref 3.5–5.1)
PROT SERPL-MCNC: 8.2 GM/DL (ref 6.4–8.2)
SODIUM SERPL-SCNC: 141 MEQ/L (ref 136–145)

## 2022-02-10 ENCOUNTER — HOSPITAL ENCOUNTER (OUTPATIENT)
Dept: HOSPITAL 53 - SKLAB6 | Age: 87
End: 2022-02-10
Attending: INTERNAL MEDICINE
Payer: MEDICARE

## 2022-02-10 DIAGNOSIS — R63.4: Primary | ICD-10-CM

## 2022-02-15 ENCOUNTER — HOSPITAL ENCOUNTER (OUTPATIENT)
Dept: HOSPITAL 53 - SKLAB6 | Age: 87
End: 2022-02-15
Attending: INTERNAL MEDICINE
Payer: MEDICARE

## 2022-02-15 DIAGNOSIS — Z22.7: Primary | ICD-10-CM

## 2022-02-15 LAB
ALBUMIN SERPL BCG-MCNC: 2.6 GM/DL (ref 3.2–5.2)
ALT SERPL W P-5'-P-CCNC: 17 U/L (ref 12–78)
BILIRUB SERPL-MCNC: 0.9 MG/DL (ref 0.2–1)
BUN SERPL-MCNC: 20 MG/DL (ref 7–18)
CALCIUM SERPL-MCNC: 8.6 MG/DL (ref 8.8–10.2)
CHLORIDE SERPL-SCNC: 102 MEQ/L (ref 98–107)
CO2 SERPL-SCNC: 25 MEQ/L (ref 21–32)
CREAT SERPL-MCNC: 1.01 MG/DL (ref 0.7–1.3)
GFR SERPL CREATININE-BSD FRML MDRD: > 60 ML/MIN/{1.73_M2} (ref 35–?)
GLUCOSE SERPL-MCNC: 143 MG/DL (ref 70–100)
POTASSIUM SERPL-SCNC: 4 MEQ/L (ref 3.5–5.1)
PROT SERPL-MCNC: 7.8 GM/DL (ref 6.4–8.2)
SODIUM SERPL-SCNC: 135 MEQ/L (ref 136–145)

## 2022-02-17 ENCOUNTER — HOSPITAL ENCOUNTER (OUTPATIENT)
Dept: HOSPITAL 53 - SKLAB6 | Age: 87
End: 2022-02-17
Attending: INTERNAL MEDICINE
Payer: MEDICARE

## 2022-02-17 DIAGNOSIS — R50.9: ICD-10-CM

## 2022-02-17 DIAGNOSIS — I51.7: Primary | ICD-10-CM

## 2022-02-17 LAB
APPEARANCE UR: CLEAR
BACTERIA UR QL AUTO: (no result)
BILIRUB UR QL STRIP.AUTO: NEGATIVE
BUN SERPL-MCNC: 19 MG/DL (ref 7–18)
CALCIUM SERPL-MCNC: 8.5 MG/DL (ref 8.8–10.2)
CHLORIDE SERPL-SCNC: 103 MEQ/L (ref 98–107)
CO2 SERPL-SCNC: 30 MEQ/L (ref 21–32)
CREAT SERPL-MCNC: 0.98 MG/DL (ref 0.7–1.3)
GFR SERPL CREATININE-BSD FRML MDRD: > 60 ML/MIN/{1.73_M2} (ref 35–?)
GLUCOSE SERPL-MCNC: 131 MG/DL (ref 70–100)
GLUCOSE UR QL STRIP.AUTO: NEGATIVE MG/DL
HCT VFR BLD AUTO: 35.9 % (ref 42–52)
HGB BLD-MCNC: 11.4 G/DL (ref 13.5–17.5)
HGB UR QL STRIP.AUTO: (no result)
KETONES UR QL STRIP.AUTO: NEGATIVE MG/DL
LEUKOCYTE ESTERASE UR QL STRIP.AUTO: NEGATIVE
MCH RBC QN AUTO: 29.5 PG (ref 27–33)
MCHC RBC AUTO-ENTMCNC: 31.8 G/DL (ref 32–36.5)
MCV RBC AUTO: 92.8 FL (ref 80–96)
MUCOUS THREADS URNS QL MICRO: (no result)
NITRITE UR QL STRIP.AUTO: NEGATIVE
PH UR STRIP.AUTO: 6 UNITS (ref 5–9)
PLATELET # BLD AUTO: 257 10^3/UL (ref 150–450)
POTASSIUM SERPL-SCNC: 4.8 MEQ/L (ref 3.5–5.1)
PROT UR QL STRIP.AUTO: (no result) MG/DL
RBC # BLD AUTO: 3.87 10^6/UL (ref 4.3–6.1)
RBC # UR AUTO: 68 /HPF (ref 0–3)
SODIUM SERPL-SCNC: 139 MEQ/L (ref 136–145)
SP GR UR STRIP.AUTO: 1.02 (ref 1–1.03)
SQUAMOUS #/AREA URNS AUTO: 1 /HPF (ref 0–6)
UROBILINOGEN UR QL STRIP.AUTO: 0.2 MG/DL (ref 0–2)
WBC # BLD AUTO: 9.6 10^3/UL (ref 4–10)
WBC #/AREA URNS AUTO: 2 /HPF (ref 0–3)

## 2022-02-22 ENCOUNTER — HOSPITAL ENCOUNTER (OUTPATIENT)
Dept: HOSPITAL 53 - SKLAB6 | Age: 87
End: 2022-02-22
Attending: INTERNAL MEDICINE
Payer: MEDICARE

## 2022-02-22 DIAGNOSIS — Z22.7: Primary | ICD-10-CM

## 2022-02-22 LAB
ALBUMIN SERPL BCG-MCNC: 2.2 GM/DL (ref 3.2–5.2)
ALT SERPL W P-5'-P-CCNC: 14 U/L (ref 12–78)
BILIRUB SERPL-MCNC: 0.3 MG/DL (ref 0.2–1)
BUN SERPL-MCNC: 18 MG/DL (ref 7–18)
CALCIUM SERPL-MCNC: 8.5 MG/DL (ref 8.8–10.2)
CHLORIDE SERPL-SCNC: 103 MEQ/L (ref 98–107)
CO2 SERPL-SCNC: 27 MEQ/L (ref 21–32)
CREAT SERPL-MCNC: 0.89 MG/DL (ref 0.7–1.3)
GFR SERPL CREATININE-BSD FRML MDRD: > 60 ML/MIN/{1.73_M2} (ref 35–?)
GLUCOSE SERPL-MCNC: 106 MG/DL (ref 70–100)
POTASSIUM SERPL-SCNC: 4.1 MEQ/L (ref 3.5–5.1)
PROT SERPL-MCNC: 7.6 GM/DL (ref 6.4–8.2)
SODIUM SERPL-SCNC: 137 MEQ/L (ref 136–145)

## 2022-03-01 ENCOUNTER — HOSPITAL ENCOUNTER (OUTPATIENT)
Dept: HOSPITAL 53 - SKLAB6 | Age: 87
End: 2022-03-01
Attending: INTERNAL MEDICINE
Payer: MEDICARE

## 2022-03-01 DIAGNOSIS — Z22.7: Primary | ICD-10-CM

## 2022-03-01 LAB
ALBUMIN SERPL BCG-MCNC: 2.4 GM/DL (ref 3.2–5.2)
ALT SERPL W P-5'-P-CCNC: 17 U/L (ref 12–78)
BILIRUB SERPL-MCNC: 0.2 MG/DL (ref 0.2–1)
BUN SERPL-MCNC: 21 MG/DL (ref 7–18)
CALCIUM SERPL-MCNC: 8.3 MG/DL (ref 8.8–10.2)
CHLORIDE SERPL-SCNC: 106 MEQ/L (ref 98–107)
CO2 SERPL-SCNC: 30 MEQ/L (ref 21–32)
CREAT SERPL-MCNC: 0.88 MG/DL (ref 0.7–1.3)
GFR SERPL CREATININE-BSD FRML MDRD: > 60 ML/MIN/{1.73_M2} (ref 35–?)
GLUCOSE SERPL-MCNC: 103 MG/DL (ref 70–100)
POTASSIUM SERPL-SCNC: 4.3 MEQ/L (ref 3.5–5.1)
PROT SERPL-MCNC: 6.8 GM/DL (ref 6.4–8.2)
SODIUM SERPL-SCNC: 140 MEQ/L (ref 136–145)

## 2022-04-14 ENCOUNTER — HOSPITAL ENCOUNTER (OUTPATIENT)
Dept: HOSPITAL 53 - SKLAB6 | Age: 87
End: 2022-04-14
Attending: INTERNAL MEDICINE
Payer: MEDICARE

## 2022-04-14 DIAGNOSIS — F03.91: Primary | ICD-10-CM

## 2022-05-25 ENCOUNTER — HOSPITAL ENCOUNTER (OUTPATIENT)
Dept: HOSPITAL 53 - SKLAB6 | Age: 87
End: 2022-05-25
Attending: INTERNAL MEDICINE
Payer: MEDICARE

## 2022-05-25 DIAGNOSIS — Z79.899: Primary | ICD-10-CM

## 2022-05-25 LAB
ALBUMIN SERPL BCG-MCNC: 3 GM/DL (ref 3.2–5.2)
ALT SERPL W P-5'-P-CCNC: 16 U/L (ref 12–78)
BILIRUB SERPL-MCNC: 0.3 MG/DL (ref 0.2–1)
BUN SERPL-MCNC: 14 MG/DL (ref 7–18)
CALCIUM SERPL-MCNC: 9.2 MG/DL (ref 8.8–10.2)
CHLORIDE SERPL-SCNC: 107 MEQ/L (ref 98–107)
CO2 SERPL-SCNC: 29 MEQ/L (ref 21–32)
CREAT SERPL-MCNC: 0.88 MG/DL (ref 0.7–1.3)
GFR SERPL CREATININE-BSD FRML MDRD: > 60 ML/MIN/{1.73_M2} (ref 35–?)
GLUCOSE SERPL-MCNC: 107 MG/DL (ref 70–100)
POTASSIUM SERPL-SCNC: 4.7 MEQ/L (ref 3.5–5.1)
PROT SERPL-MCNC: 6.7 GM/DL (ref 6.4–8.2)
SODIUM SERPL-SCNC: 142 MEQ/L (ref 136–145)

## 2022-07-14 ENCOUNTER — HOSPITAL ENCOUNTER (OUTPATIENT)
Dept: HOSPITAL 53 - SKLAB6 | Age: 87
End: 2022-07-14
Attending: INTERNAL MEDICINE
Payer: MEDICARE

## 2022-07-14 DIAGNOSIS — F03.91: Primary | ICD-10-CM

## 2022-12-22 ENCOUNTER — HOSPITAL ENCOUNTER (OUTPATIENT)
Dept: HOSPITAL 53 - SKLAB6 | Age: 87
End: 2022-12-22
Attending: NURSE PRACTITIONER
Payer: MEDICARE

## 2022-12-22 DIAGNOSIS — I51.7: Primary | ICD-10-CM

## 2022-12-23 ENCOUNTER — HOSPITAL ENCOUNTER (OUTPATIENT)
Dept: HOSPITAL 53 - SKLAB6 | Age: 87
End: 2022-12-23
Attending: NURSE PRACTITIONER
Payer: MEDICARE

## 2022-12-23 DIAGNOSIS — R05.9: Primary | ICD-10-CM

## 2023-03-25 ENCOUNTER — HOSPITAL ENCOUNTER (OUTPATIENT)
Dept: HOSPITAL 53 - SKLAB6 | Age: 88
End: 2023-03-25
Attending: INTERNAL MEDICINE

## 2023-03-25 DIAGNOSIS — R19.5: Primary | ICD-10-CM

## 2023-03-25 DIAGNOSIS — R14.0: ICD-10-CM

## 2024-01-14 ENCOUNTER — HOSPITAL ENCOUNTER (OUTPATIENT)
Dept: HOSPITAL 53 - SKLAB5 | Age: 89
End: 2024-01-14
Attending: INTERNAL MEDICINE
Payer: MEDICARE

## 2024-01-14 DIAGNOSIS — R09.81: Primary | ICD-10-CM

## 2024-01-14 DIAGNOSIS — Z79.899: ICD-10-CM

## 2024-01-14 LAB
BASOPHILS # BLD AUTO: 0 10^3/UL (ref 0–0.2)
BASOPHILS NFR BLD AUTO: 0.4 % (ref 0–1)
BUN SERPL-MCNC: 21 MG/DL (ref 9–23)
CALCIUM SERPL-MCNC: 8.3 MG/DL (ref 8.3–10.6)
CHLORIDE SERPL-SCNC: 107 MMOL/L (ref 98–107)
CO2 SERPL-SCNC: 29 MMOL/L (ref 20–31)
CREAT SERPL-MCNC: 0.91 MG/DL (ref 0.7–1.3)
EOSINOPHIL # BLD AUTO: 0.1 10^3/UL (ref 0–0.5)
EOSINOPHIL NFR BLD AUTO: 1.5 % (ref 0–3)
GFR SERPL CREATININE-BSD FRML MDRD: > 60 ML/MIN/{1.73_M2} (ref 35–?)
GLUCOSE SERPL-MCNC: 98 MG/DL (ref 74–106)
HCT VFR BLD AUTO: 41.3 % (ref 42–52)
HGB BLD-MCNC: 13.7 G/DL (ref 13.5–17.5)
LYMPHOCYTES # BLD AUTO: 2 10^3/UL (ref 1.5–5)
LYMPHOCYTES NFR BLD AUTO: 24.8 % (ref 24–44)
MCH RBC QN AUTO: 30.7 PG (ref 27–33)
MCHC RBC AUTO-ENTMCNC: 33.2 G/DL (ref 32–36.5)
MCV RBC AUTO: 92.6 FL (ref 80–96)
MONOCYTES # BLD AUTO: 0.6 10^3/UL (ref 0–0.8)
MONOCYTES NFR BLD AUTO: 7.2 % (ref 2–8)
NEUTROPHILS # BLD AUTO: 5.4 10^3/UL (ref 1.5–8.5)
NEUTROPHILS NFR BLD AUTO: 65.5 % (ref 36–66)
PLATELET # BLD AUTO: 165 10^3/UL (ref 150–450)
POTASSIUM SERPL-SCNC: 4.5 MMOL/L (ref 3.5–5.1)
RBC # BLD AUTO: 4.46 10^6/UL (ref 4.3–6.1)
SODIUM SERPL-SCNC: 139 MMOL/L (ref 136–145)
WBC # BLD AUTO: 8.2 10^3/UL (ref 4–10)

## 2024-04-30 ENCOUNTER — HOSPITAL ENCOUNTER (OUTPATIENT)
Dept: HOSPITAL 53 - SKLAB5 | Age: 89
End: 2024-04-30
Attending: INTERNAL MEDICINE
Payer: MEDICARE

## 2024-04-30 DIAGNOSIS — I27.20: Primary | ICD-10-CM

## 2024-04-30 DIAGNOSIS — R06.02: ICD-10-CM
